# Patient Record
Sex: FEMALE | Race: WHITE | NOT HISPANIC OR LATINO
[De-identification: names, ages, dates, MRNs, and addresses within clinical notes are randomized per-mention and may not be internally consistent; named-entity substitution may affect disease eponyms.]

---

## 2017-01-25 DIAGNOSIS — N64.89 OTHER SPECIFIED DISORDERS OF BREAST: ICD-10-CM

## 2017-07-19 ENCOUNTER — APPOINTMENT (OUTPATIENT)
Dept: OBGYN | Facility: CLINIC | Age: 72
End: 2017-07-19

## 2017-09-21 ENCOUNTER — RX ONLY (RX ONLY)
Age: 72
End: 2017-09-21

## 2018-05-01 ENCOUNTER — RX ONLY (RX ONLY)
Age: 73
End: 2018-05-01

## 2018-06-06 ENCOUNTER — RX ONLY (RX ONLY)
Age: 73
End: 2018-06-06

## 2019-07-24 ENCOUNTER — OFFICE (OUTPATIENT)
Dept: URBAN - METROPOLITAN AREA CLINIC 105 | Facility: CLINIC | Age: 74
Setting detail: OPHTHALMOLOGY
End: 2019-07-24
Payer: MEDICARE

## 2019-07-24 DIAGNOSIS — H25.13: ICD-10-CM

## 2019-07-24 DIAGNOSIS — H11.153: ICD-10-CM

## 2019-07-24 DIAGNOSIS — H40.033: ICD-10-CM

## 2019-07-24 DIAGNOSIS — H40.1232: ICD-10-CM

## 2019-07-24 DIAGNOSIS — H47.393: ICD-10-CM

## 2019-07-24 PROCEDURE — 92020 GONIOSCOPY: CPT | Performed by: OPHTHALMOLOGY

## 2019-07-24 PROCEDURE — 92014 COMPRE OPH EXAM EST PT 1/>: CPT | Performed by: OPHTHALMOLOGY

## 2019-07-24 PROCEDURE — 92250 FUNDUS PHOTOGRAPHY W/I&R: CPT | Performed by: OPHTHALMOLOGY

## 2019-07-24 ASSESSMENT — REFRACTION_MANIFEST
OS_VA2: 20/
OS_VA2: 20/
OD_VA1: 20/
OD_VA3: 20/
OD_VA1: 20/20
OD_SPHERE: +3.25
OS_SPHERE: +2.00
OD_VA2: 20/
OD_VA3: 20/
OS_VA1: 20/
OD_VA2: 20/
OS_VA3: 20/
OS_CYLINDER: -0.75
OU_VA: 20/
OS_VA3: 20/
OU_VA: 20/
OS_AXIS: 090
OD_AXIS: 090
OS_VA1: 20/40
OD_CYLINDER: -0.50

## 2019-07-24 ASSESSMENT — REFRACTION_CURRENTRX
OS_VPRISM_DIRECTION: PROGS
OD_VPRISM_DIRECTION: PROGS
OS_OVR_VA: 20/
OD_ADD: +2.50
OD_OVR_VA: 20/
OD_OVR_VA: 20/
OD_CYLINDER: 0.00
OD_AXIS: 180
OD_OVR_VA: 20/
OD_SPHERE: +3.25
OS_SPHERE: +3.00
OS_AXIS: 115
OS_ADD: +2.50
OS_OVR_VA: 20/
OS_OVR_VA: 20/
OS_CYLINDER: -0.25

## 2019-07-24 ASSESSMENT — AXIALLENGTH_DERIVED
OD_AL: 22.5226
OS_AL: 23.019
OS_AL: 23.1121
OD_AL: 22.4783

## 2019-07-24 ASSESSMENT — SPHEQUIV_DERIVED
OD_SPHEQUIV: 2.875
OS_SPHEQUIV: 1.375
OD_SPHEQUIV: 3
OS_SPHEQUIV: 1.625

## 2019-07-24 ASSESSMENT — CONFRONTATIONAL VISUAL FIELD TEST (CVF)
OS_FINDINGS: FULL
OD_FINDINGS: FULL

## 2019-07-24 ASSESSMENT — REFRACTION_AUTOREFRACTION
OS_CYLINDER: -0.75
OD_AXIS: 042
OS_SPHERE: +1.75
OS_AXIS: 106
OD_CYLINDER: -0.25
OD_SPHERE: +3.00

## 2019-07-24 ASSESSMENT — KERATOMETRY
OS_AXISANGLE_DEGREES: 120
OS_K1POWER_DIOPTERS: 43.25
OD_AXISANGLE_DEGREES: 090
OS_K2POWER_DIOPTERS: 43.50
OD_K1POWER_DIOPTERS: 43.50
OD_K2POWER_DIOPTERS: 43.50

## 2019-07-24 ASSESSMENT — VISUAL ACUITY
OD_BCVA: 20/40
OS_BCVA: 20/30+2

## 2020-02-12 ENCOUNTER — OFFICE (OUTPATIENT)
Dept: URBAN - METROPOLITAN AREA CLINIC 105 | Facility: CLINIC | Age: 75
Setting detail: OPHTHALMOLOGY
End: 2020-02-12
Payer: MEDICARE

## 2020-02-12 DIAGNOSIS — H47.393: ICD-10-CM

## 2020-02-12 DIAGNOSIS — H40.1232: ICD-10-CM

## 2020-02-12 DIAGNOSIS — H11.153: ICD-10-CM

## 2020-02-12 DIAGNOSIS — H52.4: ICD-10-CM

## 2020-02-12 DIAGNOSIS — H25.13: ICD-10-CM

## 2020-02-12 DIAGNOSIS — H40.033: ICD-10-CM

## 2020-02-12 PROCEDURE — 92020 GONIOSCOPY: CPT | Performed by: OPHTHALMOLOGY

## 2020-02-12 PROCEDURE — 92083 EXTENDED VISUAL FIELD XM: CPT | Performed by: OPHTHALMOLOGY

## 2020-02-12 PROCEDURE — 92015 DETERMINE REFRACTIVE STATE: CPT | Performed by: OPHTHALMOLOGY

## 2020-02-12 PROCEDURE — 92012 INTRM OPH EXAM EST PATIENT: CPT | Performed by: OPHTHALMOLOGY

## 2020-02-12 ASSESSMENT — VISUAL ACUITY
OS_BCVA: 20/25-
OD_BCVA: 20/40-2

## 2020-02-12 ASSESSMENT — REFRACTION_CURRENTRX
OD_AXIS: 180
OD_OVR_VA: 20/
OS_AXIS: 115
OD_VPRISM_DIRECTION: PROGS
OS_CYLINDER: -0.25
OS_VPRISM_DIRECTION: PROGS
OS_SPHERE: +3.00
OS_OVR_VA: 20/
OD_ADD: +2.50
OD_CYLINDER: 0.00
OD_SPHERE: +3.25
OS_ADD: +2.50

## 2020-02-12 ASSESSMENT — REFRACTION_AUTOREFRACTION
OS_SPHERE: +1.50
OD_AXIS: 089
OD_CYLINDER: -0.25
OS_AXIS: 112
OS_CYLINDER: -0.25
OD_SPHERE: +3.00

## 2020-02-12 ASSESSMENT — REFRACTION_MANIFEST
OS_SPHERE: +1.50
OS_VA1: 20/40
OS_CYLINDER: -0.50
OD_CYLINDER: -0.25
OS_ADD: +3.00
OD_SPHERE: +3.00
OS_AXIS: 090
OD_AXIS: 090
OD_ADD: +3.00
OD_VA1: 20/25-

## 2020-02-12 ASSESSMENT — KERATOMETRY
OD_K1POWER_DIOPTERS: 43.50
OD_K2POWER_DIOPTERS: 43.50
OS_K2POWER_DIOPTERS: 43.75
OS_AXISANGLE_DEGREES: 112
OD_AXISANGLE_DEGREES: 090
OS_K1POWER_DIOPTERS: 43.50

## 2020-02-12 ASSESSMENT — SPHEQUIV_DERIVED
OD_SPHEQUIV: 2.875
OS_SPHEQUIV: 1.375
OS_SPHEQUIV: 1.25
OD_SPHEQUIV: 2.875

## 2020-02-12 ASSESSMENT — AXIALLENGTH_DERIVED
OS_AL: 23.0708
OS_AL: 23.0243
OD_AL: 22.5226
OD_AL: 22.5226

## 2020-02-12 ASSESSMENT — CONFRONTATIONAL VISUAL FIELD TEST (CVF)
OD_FINDINGS: FULL
OS_FINDINGS: FULL

## 2020-10-01 ENCOUNTER — OFFICE (OUTPATIENT)
Dept: URBAN - METROPOLITAN AREA CLINIC 105 | Facility: CLINIC | Age: 75
Setting detail: OPHTHALMOLOGY
End: 2020-10-01
Payer: MEDICARE

## 2020-10-01 DIAGNOSIS — H16.222: ICD-10-CM

## 2020-10-01 DIAGNOSIS — H40.033: ICD-10-CM

## 2020-10-01 DIAGNOSIS — H47.393: ICD-10-CM

## 2020-10-01 DIAGNOSIS — H16.221: ICD-10-CM

## 2020-10-01 DIAGNOSIS — H16.223: ICD-10-CM

## 2020-10-01 DIAGNOSIS — H40.1232: ICD-10-CM

## 2020-10-01 DIAGNOSIS — H52.03: ICD-10-CM

## 2020-10-01 DIAGNOSIS — H11.153: ICD-10-CM

## 2020-10-01 DIAGNOSIS — H25.13: ICD-10-CM

## 2020-10-01 PROCEDURE — 92133 CPTRZD OPH DX IMG PST SGM ON: CPT | Performed by: OPHTHALMOLOGY

## 2020-10-01 PROCEDURE — 68761 CLOSE TEAR DUCT OPENING: CPT | Performed by: OPHTHALMOLOGY

## 2020-10-01 PROCEDURE — 92014 COMPRE OPH EXAM EST PT 1/>: CPT | Performed by: OPHTHALMOLOGY

## 2020-10-01 PROCEDURE — 92020 GONIOSCOPY: CPT | Performed by: OPHTHALMOLOGY

## 2020-10-01 ASSESSMENT — REFRACTION_MANIFEST
OD_CYLINDER: -0.25
OD_AXIS: 090
OS_ADD: +3.00
OD_ADD: +3.00
OS_VA1: 20/40
OD_SPHERE: +3.00
OS_CYLINDER: -0.50
OS_AXIS: 090
OS_SPHERE: +1.50
OD_VA1: 20/25-

## 2020-10-01 ASSESSMENT — SPHEQUIV_DERIVED
OD_SPHEQUIV: 2.875
OS_SPHEQUIV: 1.375
OS_SPHEQUIV: 1.25
OD_SPHEQUIV: 3.125

## 2020-10-01 ASSESSMENT — KERATOMETRY
OS_K1POWER_DIOPTERS: 43.50
OD_K2POWER_DIOPTERS: 43.50
OD_K1POWER_DIOPTERS: 43.50
OD_AXISANGLE_DEGREES: 090
OS_AXISANGLE_DEGREES: 114
OS_K2POWER_DIOPTERS: 43.75

## 2020-10-01 ASSESSMENT — REFRACTION_CURRENTRX
OD_ADD: +3.00
OS_VPRISM_DIRECTION: PROGS
OS_AXIS: 091
OS_SPHERE: +1.50
OD_SPHERE: +3.25
OS_ADD: +3.00
OS_OVR_VA: 20/
OD_VPRISM_DIRECTION: PROGS
OD_CYLINDER: -0.50
OD_OVR_VA: 20/
OS_CYLINDER: -0.50
OD_AXIS: 092

## 2020-10-01 ASSESSMENT — REFRACTION_AUTOREFRACTION
OS_SPHERE: +1.50
OS_AXIS: 079
OD_CYLINDER: -0.75
OD_SPHERE: +3.50
OD_AXIS: 090
OS_CYLINDER: -0.25

## 2020-10-01 ASSESSMENT — PACHYMETRY
OS_CT_CORRECTION: -4
OS_CT_UM: 600
OD_CT_CORRECTION: -4
OD_CT_UM: 600

## 2020-10-01 ASSESSMENT — CONFRONTATIONAL VISUAL FIELD TEST (CVF)
OD_FINDINGS: FULL
OS_FINDINGS: FULL

## 2020-10-01 ASSESSMENT — TONOMETRY
OS_IOP_MMHG: 14
OD_IOP_MMHG: 16

## 2020-10-01 ASSESSMENT — AXIALLENGTH_DERIVED
OS_AL: 23.0243
OD_AL: 22.4341
OS_AL: 23.0708
OD_AL: 22.5226

## 2020-10-01 ASSESSMENT — SUPERFICIAL PUNCTATE KERATITIS (SPK): OS_SPK: 2+

## 2020-10-01 ASSESSMENT — VISUAL ACUITY
OS_BCVA: 20/25+1
OD_BCVA: 20/40-1

## 2020-11-12 ENCOUNTER — OFFICE (OUTPATIENT)
Dept: URBAN - METROPOLITAN AREA CLINIC 105 | Facility: CLINIC | Age: 75
Setting detail: OPHTHALMOLOGY
End: 2020-11-12
Payer: MEDICARE

## 2020-11-12 DIAGNOSIS — H40.1232: ICD-10-CM

## 2020-11-12 PROCEDURE — 92012 INTRM OPH EXAM EST PATIENT: CPT | Performed by: OPHTHALMOLOGY

## 2020-11-12 ASSESSMENT — AXIALLENGTH_DERIVED
OS_AL: 23.1121
OS_AL: 23.1589
OD_AL: 22.6066
OD_AL: 22.6514

## 2020-11-12 ASSESSMENT — REFRACTION_MANIFEST
OS_VA1: 20/40
OS_AXIS: 090
OS_CYLINDER: -0.50
OD_ADD: +3.00
OD_SPHERE: +3.00
OD_AXIS: 090
OD_CYLINDER: -0.25
OS_ADD: +3.00
OD_VA1: 20/25-
OS_SPHERE: +1.50

## 2020-11-12 ASSESSMENT — PACHYMETRY
OD_CT_UM: 600
OS_CT_UM: 600
OD_CT_CORRECTION: -4
OS_CT_CORRECTION: -4

## 2020-11-12 ASSESSMENT — REFRACTION_CURRENTRX
OD_ADD: +3.25
OS_SPHERE: +1.50
OS_OVR_VA: 20/
OS_CYLINDER: -0.50
OD_AXIS: 103
OS_VPRISM_DIRECTION: PROGS
OD_CYLINDER: -0.25
OD_VPRISM_DIRECTION: PROGS
OD_OVR_VA: 20/
OS_ADD: +3.25
OD_SPHERE: +3.25
OS_AXIS: 084

## 2020-11-12 ASSESSMENT — TONOMETRY
OS_IOP_MMHG: 15
OD_IOP_MMHG: 14

## 2020-11-12 ASSESSMENT — KERATOMETRY
OS_K2POWER_DIOPTERS: 43.50
OD_K1POWER_DIOPTERS: 43.00
OD_K2POWER_DIOPTERS: 43.50
OD_AXISANGLE_DEGREES: 001
OS_AXISANGLE_DEGREES: 121
OS_K1POWER_DIOPTERS: 43.25

## 2020-11-12 ASSESSMENT — SPHEQUIV_DERIVED
OD_SPHEQUIV: 2.875
OS_SPHEQUIV: 1.25
OD_SPHEQUIV: 2.75
OS_SPHEQUIV: 1.375

## 2020-11-12 ASSESSMENT — REFRACTION_AUTOREFRACTION
OD_CYLINDER: -0.50
OD_AXIS: 094
OD_SPHERE: +3.00
OS_SPHERE: +1.75
OS_AXIS: 102
OS_CYLINDER: -0.75

## 2020-11-12 ASSESSMENT — VISUAL ACUITY
OS_BCVA: 20/25
OD_BCVA: 20/40

## 2020-11-12 ASSESSMENT — SUPERFICIAL PUNCTATE KERATITIS (SPK): OS_SPK: 2+

## 2020-11-12 ASSESSMENT — CONFRONTATIONAL VISUAL FIELD TEST (CVF)
OS_FINDINGS: FULL
OD_FINDINGS: FULL

## 2021-06-10 ENCOUNTER — OFFICE (OUTPATIENT)
Dept: URBAN - METROPOLITAN AREA CLINIC 105 | Facility: CLINIC | Age: 76
Setting detail: OPHTHALMOLOGY
End: 2021-06-10
Payer: MEDICARE

## 2021-06-10 DIAGNOSIS — H40.033: ICD-10-CM

## 2021-06-10 DIAGNOSIS — H16.222: ICD-10-CM

## 2021-06-10 DIAGNOSIS — H40.1232: ICD-10-CM

## 2021-06-10 DIAGNOSIS — H25.13: ICD-10-CM

## 2021-06-10 PROCEDURE — 92133 CPTRZD OPH DX IMG PST SGM ON: CPT | Performed by: OPHTHALMOLOGY

## 2021-06-10 PROCEDURE — 92020 GONIOSCOPY: CPT | Performed by: OPHTHALMOLOGY

## 2021-06-10 PROCEDURE — 99213 OFFICE O/P EST LOW 20 MIN: CPT | Performed by: OPHTHALMOLOGY

## 2021-06-10 PROCEDURE — 92083 EXTENDED VISUAL FIELD XM: CPT | Performed by: OPHTHALMOLOGY

## 2021-06-10 ASSESSMENT — SPHEQUIV_DERIVED
OD_SPHEQUIV: 2.875
OS_SPHEQUIV: 1.25
OS_SPHEQUIV: 0.75
OD_SPHEQUIV: 2.625

## 2021-06-10 ASSESSMENT — AXIALLENGTH_DERIVED
OS_AL: 23.1147
OS_AL: 23.3033
OD_AL: 22.6539
OD_AL: 22.5645

## 2021-06-10 ASSESSMENT — REFRACTION_AUTOREFRACTION
OS_CYLINDER: -0.50
OS_AXIS: 094
OD_AXIS: 095
OD_CYLINDER: -0.75
OD_SPHERE: +3.00
OS_SPHERE: +1.00

## 2021-06-10 ASSESSMENT — KERATOMETRY
OD_K2POWER_DIOPTERS: 43.75
OS_K1POWER_DIOPTERS: 43.50
OS_K2POWER_DIOPTERS: 43.50
OD_AXISANGLE_DEGREES: 180
OD_K1POWER_DIOPTERS: 43.00
OS_AXISANGLE_DEGREES: 090

## 2021-06-10 ASSESSMENT — REFRACTION_CURRENTRX
OS_VPRISM_DIRECTION: PROGS
OS_SPHERE: +1.50
OS_AXIS: 086
OS_CYLINDER: -0.50
OS_ADD: +3.00
OD_SPHERE: +3.25
OD_VPRISM_DIRECTION: PROGS
OD_CYLINDER: -0.50
OS_OVR_VA: 20/
OD_ADD: +3.00
OD_OVR_VA: 20/
OD_AXIS: 099

## 2021-06-10 ASSESSMENT — REFRACTION_MANIFEST
OD_SPHERE: +3.00
OS_VA1: 20/40
OD_AXIS: 090
OD_CYLINDER: -0.25
OD_ADD: +3.00
OS_AXIS: 090
OD_VA1: 20/25-
OS_SPHERE: +1.50
OS_ADD: +3.00
OS_CYLINDER: -0.50

## 2021-06-10 ASSESSMENT — PACHYMETRY
OS_CT_UM: 600
OS_CT_CORRECTION: -4
OD_CT_UM: 600
OD_CT_CORRECTION: -4

## 2021-06-10 ASSESSMENT — SUPERFICIAL PUNCTATE KERATITIS (SPK): OS_SPK: 2+

## 2021-06-10 ASSESSMENT — CONFRONTATIONAL VISUAL FIELD TEST (CVF)
OD_FINDINGS: FULL
OS_FINDINGS: FULL

## 2021-06-10 ASSESSMENT — TONOMETRY
OS_IOP_MMHG: 14
OD_IOP_MMHG: 10

## 2021-06-10 ASSESSMENT — VISUAL ACUITY
OS_BCVA: 20/20-2
OD_BCVA: 20/30-1

## 2021-07-01 ENCOUNTER — OFFICE (OUTPATIENT)
Dept: URBAN - METROPOLITAN AREA CLINIC 113 | Facility: CLINIC | Age: 76
Setting detail: OPHTHALMOLOGY
End: 2021-07-01
Payer: MEDICARE

## 2021-07-01 DIAGNOSIS — H40.033: ICD-10-CM

## 2021-07-01 DIAGNOSIS — H40.1232: ICD-10-CM

## 2021-07-01 DIAGNOSIS — H25.13: ICD-10-CM

## 2021-07-01 PROCEDURE — 99214 OFFICE O/P EST MOD 30 MIN: CPT | Performed by: OPHTHALMOLOGY

## 2021-07-01 PROCEDURE — 92250 FUNDUS PHOTOGRAPHY W/I&R: CPT | Performed by: OPHTHALMOLOGY

## 2021-07-01 ASSESSMENT — REFRACTION_MANIFEST
OD_AXIS: 090
OD_ADD: +3.00
OD_CYLINDER: -0.25
OS_AXIS: 090
OD_VA1: 20/25-
OS_CYLINDER: -0.50
OS_VA1: 20/40
OS_SPHERE: +1.50
OD_SPHERE: +3.00
OS_ADD: +3.00

## 2021-07-01 ASSESSMENT — AXIALLENGTH_DERIVED
OS_AL: 23.1147
OS_AL: 23.3033
OD_AL: 22.5645
OD_AL: 22.6539

## 2021-07-01 ASSESSMENT — PACHYMETRY
OD_CT_CORRECTION: -4
OD_CT_UM: 600
OS_CT_CORRECTION: -4
OS_CT_UM: 600

## 2021-07-01 ASSESSMENT — REFRACTION_AUTOREFRACTION
OS_CYLINDER: -0.50
OS_SPHERE: +1.00
OD_CYLINDER: -0.75
OS_AXIS: 094
OD_AXIS: 095
OD_SPHERE: +3.00

## 2021-07-01 ASSESSMENT — SPHEQUIV_DERIVED
OS_SPHEQUIV: 0.75
OD_SPHEQUIV: 2.875
OS_SPHEQUIV: 1.25
OD_SPHEQUIV: 2.625

## 2021-07-01 ASSESSMENT — KERATOMETRY
OD_K1POWER_DIOPTERS: 43.00
OS_K1POWER_DIOPTERS: 43.50
OD_K2POWER_DIOPTERS: 43.75
OD_AXISANGLE_DEGREES: 180
OS_K2POWER_DIOPTERS: 43.50
OS_AXISANGLE_DEGREES: 090

## 2021-07-01 ASSESSMENT — REFRACTION_CURRENTRX
OS_VPRISM_DIRECTION: PROGS
OS_OVR_VA: 20/
OS_SPHERE: +1.50
OS_CYLINDER: -0.50
OD_ADD: +3.00
OS_AXIS: 086
OS_ADD: +3.00
OD_VPRISM_DIRECTION: PROGS
OD_OVR_VA: 20/
OD_SPHERE: +3.25
OD_AXIS: 099
OD_CYLINDER: -0.50

## 2021-07-01 ASSESSMENT — SUPERFICIAL PUNCTATE KERATITIS (SPK): OS_SPK: 2+

## 2021-07-01 ASSESSMENT — TONOMETRY
OS_IOP_MMHG: 19
OD_IOP_MMHG: 18

## 2021-07-01 ASSESSMENT — CONFRONTATIONAL VISUAL FIELD TEST (CVF)
OD_FINDINGS: FULL
OS_FINDINGS: FULL

## 2021-07-01 ASSESSMENT — VISUAL ACUITY
OD_BCVA: 20/40
OS_BCVA: 20/25

## 2021-08-05 ENCOUNTER — OFFICE (OUTPATIENT)
Dept: URBAN - METROPOLITAN AREA CLINIC 113 | Facility: CLINIC | Age: 76
Setting detail: OPHTHALMOLOGY
End: 2021-08-05
Payer: MEDICARE

## 2021-08-05 ENCOUNTER — RX ONLY (RX ONLY)
Age: 76
End: 2021-08-05

## 2021-08-05 DIAGNOSIS — H40.033: ICD-10-CM

## 2021-08-05 DIAGNOSIS — H40.1232: ICD-10-CM

## 2021-08-05 DIAGNOSIS — H25.13: ICD-10-CM

## 2021-08-05 PROCEDURE — 92012 INTRM OPH EXAM EST PATIENT: CPT | Performed by: OPHTHALMOLOGY

## 2021-08-05 ASSESSMENT — SUPERFICIAL PUNCTATE KERATITIS (SPK): OS_SPK: 2+

## 2021-08-05 ASSESSMENT — REFRACTION_CURRENTRX
OS_ADD: +3.00
OD_AXIS: 099
OS_CYLINDER: -0.50
OD_ADD: +3.00
OS_VPRISM_DIRECTION: PROGS
OD_CYLINDER: -0.50
OD_OVR_VA: 20/
OS_SPHERE: +1.50
OD_SPHERE: +3.25
OD_VPRISM_DIRECTION: PROGS
OS_OVR_VA: 20/
OS_AXIS: 086

## 2021-08-05 ASSESSMENT — SPHEQUIV_DERIVED
OD_SPHEQUIV: 2.625
OS_SPHEQUIV: 1.25
OD_SPHEQUIV: 2.875
OS_SPHEQUIV: 0.75

## 2021-08-05 ASSESSMENT — PACHYMETRY
OS_CT_UM: 600
OS_CT_CORRECTION: -4
OD_CT_CORRECTION: -4
OD_CT_UM: 600

## 2021-08-05 ASSESSMENT — REFRACTION_MANIFEST
OD_AXIS: 090
OS_CYLINDER: -0.50
OS_ADD: +3.00
OD_ADD: +3.00
OD_SPHERE: +3.00
OS_SPHERE: +1.50
OD_VA1: 20/25-
OS_VA1: 20/40
OD_CYLINDER: -0.25
OS_AXIS: 090

## 2021-08-05 ASSESSMENT — VISUAL ACUITY
OD_BCVA: 20/40
OS_BCVA: 20/25

## 2021-08-05 ASSESSMENT — REFRACTION_AUTOREFRACTION
OD_CYLINDER: -0.75
OD_SPHERE: +3.00
OS_CYLINDER: -0.50
OS_AXIS: 094
OD_AXIS: 095
OS_SPHERE: +1.00

## 2021-08-05 ASSESSMENT — TONOMETRY
OD_IOP_MMHG: 16
OS_IOP_MMHG: 16

## 2021-08-05 ASSESSMENT — KERATOMETRY
OD_AXISANGLE_DEGREES: 180
OS_K1POWER_DIOPTERS: 43.50
OD_K1POWER_DIOPTERS: 43.00
OD_K2POWER_DIOPTERS: 43.75
OS_AXISANGLE_DEGREES: 090
OS_K2POWER_DIOPTERS: 43.50

## 2021-08-05 ASSESSMENT — CONFRONTATIONAL VISUAL FIELD TEST (CVF)
OD_FINDINGS: FULL
OS_FINDINGS: FULL

## 2021-08-05 ASSESSMENT — AXIALLENGTH_DERIVED
OS_AL: 23.1147
OD_AL: 22.6539
OD_AL: 22.5645
OS_AL: 23.3033

## 2021-09-02 ENCOUNTER — OFFICE (OUTPATIENT)
Dept: URBAN - METROPOLITAN AREA CLINIC 113 | Facility: CLINIC | Age: 76
Setting detail: OPHTHALMOLOGY
End: 2021-09-02
Payer: MEDICARE

## 2021-09-02 DIAGNOSIS — H25.12: ICD-10-CM

## 2021-09-02 DIAGNOSIS — H40.033: ICD-10-CM

## 2021-09-02 DIAGNOSIS — H40.1132: ICD-10-CM

## 2021-09-02 DIAGNOSIS — H25.13: ICD-10-CM

## 2021-09-02 PROCEDURE — 99214 OFFICE O/P EST MOD 30 MIN: CPT | Performed by: OPHTHALMOLOGY

## 2021-09-02 PROCEDURE — 92136 OPHTHALMIC BIOMETRY: CPT | Performed by: OPHTHALMOLOGY

## 2021-09-02 ASSESSMENT — KERATOMETRY
OD_CYLAXISANGLE_DEGREES: 092
OD_K2POWER_DIOPTERS: 45.25
OS_CYLPOWER_DEGREES: 0.5
OS_CYLAXISANGLE_DEGREES: 141
OS_K2POWER_DIOPTERS: 44.50
OD_CYLPOWER_DEGREES: 1.75
OS_AXISANGLE2_DEGREES: 141
OS_K1K2_AVERAGE: 44.25
OS_K1POWER_DIOPTERS: 44.00
OD_AXISANGLE_DEGREES: 2
OD_AXISANGLE2_DEGREES: 092
OD_K1POWER_DIOPTERS: 43.50
OS_AXISANGLE_DEGREES: 51
OD_K1K2_AVERAGE: 44.375

## 2021-09-02 ASSESSMENT — CONFRONTATIONAL VISUAL FIELD TEST (CVF)
OS_FINDINGS: FULL
OD_FINDINGS: FULL

## 2021-09-02 ASSESSMENT — TONOMETRY
OS_IOP_MMHG: 14
OD_IOP_MMHG: 14

## 2021-09-02 ASSESSMENT — PACHYMETRY
OS_CT_UM: 600
OD_CT_CORRECTION: -4
OD_CT_UM: 600
OS_CT_CORRECTION: -4

## 2021-09-02 ASSESSMENT — SUPERFICIAL PUNCTATE KERATITIS (SPK): OS_SPK: 2+

## 2021-09-17 ASSESSMENT — KERATOMETRY
OS_AXISANGLE_DEGREES: 141
OD_AXISANGLE_DEGREES: 092
OS_K1POWER_DIOPTERS: 44.00
OS_K2POWER_DIOPTERS: 44.50
OD_K1POWER_DIOPTERS: 43.50
OD_K2POWER_DIOPTERS: 45.25

## 2021-09-17 ASSESSMENT — REFRACTION_CURRENTRX
OS_AXIS: 086
OD_ADD: +3.00
OD_SPHERE: +3.25
OS_VPRISM_DIRECTION: PROGS
OD_OVR_VA: 20/
OS_SPHERE: +1.50
OD_CYLINDER: -0.50
OD_VPRISM_DIRECTION: PROGS
OS_CYLINDER: -0.50
OS_OVR_VA: 20/
OD_AXIS: 099
OS_ADD: +3.00

## 2021-09-17 ASSESSMENT — SPHEQUIV_DERIVED
OS_SPHEQUIV: 0.75
OD_SPHEQUIV: 2.875
OD_SPHEQUIV: 1.875
OS_SPHEQUIV: 1.25

## 2021-09-17 ASSESSMENT — VISUAL ACUITY
OD_BCVA: 20/30
OS_BCVA: 20/20-

## 2021-09-17 ASSESSMENT — AXIALLENGTH_DERIVED
OD_AL: 22.2334
OS_AL: 23.0376
OS_AL: 22.8533
OD_AL: 22.5849

## 2021-09-17 ASSESSMENT — REFRACTION_AUTOREFRACTION
OD_AXIS: 001
OS_AXIS: 141
OS_SPHERE: +1.00
OD_SPHERE: +2.50
OS_CYLINDER: -0.50
OD_CYLINDER: -1.25

## 2021-09-17 ASSESSMENT — REFRACTION_MANIFEST
OD_ADD: +3.00
OD_CYLINDER: -0.25
OS_AXIS: 090
OS_CYLINDER: -0.50
OS_SPHERE: +1.50
OS_ADD: +3.00
OS_VA1: 20/40
OD_VA1: 20/25-
OD_SPHERE: +3.00
OD_AXIS: 090

## 2021-09-30 ENCOUNTER — OFFICE (OUTPATIENT)
Dept: URBAN - METROPOLITAN AREA CLINIC 113 | Facility: CLINIC | Age: 76
Setting detail: OPHTHALMOLOGY
End: 2021-09-30
Payer: MEDICARE

## 2021-09-30 DIAGNOSIS — H40.033: ICD-10-CM

## 2021-09-30 DIAGNOSIS — H25.13: ICD-10-CM

## 2021-09-30 DIAGNOSIS — H40.1112: ICD-10-CM

## 2021-09-30 DIAGNOSIS — H40.1122: ICD-10-CM

## 2021-09-30 PROCEDURE — 92012 INTRM OPH EXAM EST PATIENT: CPT | Performed by: OPHTHALMOLOGY

## 2021-09-30 ASSESSMENT — REFRACTION_AUTOREFRACTION
OD_CYLINDER: -1.25
OD_AXIS: 001
OS_CYLINDER: -0.50
OD_SPHERE: +2.50
OS_SPHERE: +1.00
OS_AXIS: 141

## 2021-09-30 ASSESSMENT — REFRACTION_CURRENTRX
OS_OVR_VA: 20/
OD_VPRISM_DIRECTION: PROGS
OD_SPHERE: +3.25
OD_ADD: +3.00
OD_AXIS: 099
OD_CYLINDER: -0.50
OS_ADD: +3.00
OS_AXIS: 086
OS_CYLINDER: -0.50
OD_OVR_VA: 20/
OS_VPRISM_DIRECTION: PROGS
OS_SPHERE: +1.50

## 2021-09-30 ASSESSMENT — CONFRONTATIONAL VISUAL FIELD TEST (CVF)
OD_FINDINGS: FULL
OS_FINDINGS: FULL

## 2021-09-30 ASSESSMENT — REFRACTION_MANIFEST
OS_VA1: 20/40
OS_SPHERE: +1.50
OD_VA1: 20/25-
OD_AXIS: 090
OD_CYLINDER: -0.25
OD_ADD: +3.00
OS_AXIS: 090
OD_SPHERE: +3.00
OS_ADD: +3.00
OS_CYLINDER: -0.50

## 2021-09-30 ASSESSMENT — PACHYMETRY
OS_CT_UM: 600
OD_CT_CORRECTION: -4
OS_CT_CORRECTION: -4
OD_CT_UM: 600

## 2021-09-30 ASSESSMENT — SPHEQUIV_DERIVED
OD_SPHEQUIV: 1.875
OD_SPHEQUIV: 2.875
OS_SPHEQUIV: 1.25
OS_SPHEQUIV: 0.75

## 2021-09-30 ASSESSMENT — AXIALLENGTH_DERIVED
OS_AL: 22.8533
OD_AL: 22.2334
OD_AL: 22.5849
OS_AL: 23.0376

## 2021-09-30 ASSESSMENT — VISUAL ACUITY
OD_BCVA: 20/40
OS_BCVA: 20/25

## 2021-09-30 ASSESSMENT — TONOMETRY
OS_IOP_MMHG: 14
OD_IOP_MMHG: 14

## 2021-09-30 ASSESSMENT — KERATOMETRY
OD_AXISANGLE_DEGREES: 092
OS_AXISANGLE_DEGREES: 141
OD_K1POWER_DIOPTERS: 43.50
OS_K1POWER_DIOPTERS: 44.00
OD_K2POWER_DIOPTERS: 45.25
OS_K2POWER_DIOPTERS: 44.50

## 2021-09-30 ASSESSMENT — SUPERFICIAL PUNCTATE KERATITIS (SPK): OS_SPK: 2+

## 2021-10-06 ENCOUNTER — ASC (OUTPATIENT)
Dept: URBAN - METROPOLITAN AREA SURGERY 8 | Facility: SURGERY | Age: 76
Setting detail: OPHTHALMOLOGY
End: 2021-10-06
Payer: MEDICARE

## 2021-10-06 DIAGNOSIS — H40.1122: ICD-10-CM

## 2021-10-06 DIAGNOSIS — H25.12: ICD-10-CM

## 2021-10-06 DIAGNOSIS — H52.212: ICD-10-CM

## 2021-10-06 PROCEDURE — 66984 XCAPSL CTRC RMVL W/O ECP: CPT | Performed by: OPHTHALMOLOGY

## 2021-10-06 PROCEDURE — 0191T INSERTION OF ANTERIOR SEGMENT AQUEOUS DRAINAGE DEVICE, WITHOUT EXTRAOCULAR RESERVOIR; INTERNAL APPROACH: CPT | Performed by: OPHTHALMOLOGY

## 2021-10-06 PROCEDURE — FEMTO CATARACT LASER: Performed by: OPHTHALMOLOGY

## 2021-10-06 PROCEDURE — 0356T INSERTION OF DRUG-ELUTING IMPLANT (INCLUDING PUNCTAL DILATION AND IMPLANT REMOVAL WHEN PERFORMED) INTO LACRIMAL CANALICULUS, EACH: CPT | Performed by: OPHTHALMOLOGY

## 2021-10-06 PROCEDURE — A9270 NON-COVERED ITEM OR SERVICE: HCPCS | Performed by: OPHTHALMOLOGY

## 2021-10-07 ENCOUNTER — RX ONLY (RX ONLY)
Age: 76
End: 2021-10-07

## 2021-10-07 ENCOUNTER — OFFICE (OUTPATIENT)
Dept: URBAN - METROPOLITAN AREA CLINIC 113 | Facility: CLINIC | Age: 76
Setting detail: OPHTHALMOLOGY
End: 2021-10-07
Payer: MEDICARE

## 2021-10-07 DIAGNOSIS — H40.1112: ICD-10-CM

## 2021-10-07 DIAGNOSIS — H25.11: ICD-10-CM

## 2021-10-07 DIAGNOSIS — Z96.1: ICD-10-CM

## 2021-10-07 DIAGNOSIS — H40.033: ICD-10-CM

## 2021-10-07 DIAGNOSIS — H40.1122: ICD-10-CM

## 2021-10-07 PROCEDURE — 99024 POSTOP FOLLOW-UP VISIT: CPT | Performed by: OPHTHALMOLOGY

## 2021-10-07 ASSESSMENT — KERATOMETRY
OD_AXISANGLE_DEGREES: 090
OS_K2POWER_DIOPTERS: 44.75
OD_K1POWER_DIOPTERS: 43.50
OD_K2POWER_DIOPTERS: 43.50
METHOD_AUTO_MANUAL: AUTO
OS_K1POWER_DIOPTERS: 43.00
OS_AXISANGLE_DEGREES: 170

## 2021-10-07 ASSESSMENT — AXIALLENGTH_DERIVED
OD_AL: 22.6117
OS_AL: 22.9833
OD_AL: 22.5226
OS_AL: 24.0472

## 2021-10-07 ASSESSMENT — REFRACTION_MANIFEST
OS_SPHERE: +1.50
OS_CYLINDER: -0.50
OS_VA1: 20/40
OD_VA1: 20/25-
OD_AXIS: 090
OD_ADD: +3.00
OD_SPHERE: +3.00
OS_ADD: +3.00
OS_AXIS: 090
OD_CYLINDER: -0.25

## 2021-10-07 ASSESSMENT — SPHEQUIV_DERIVED
OS_SPHEQUIV: 1.25
OD_SPHEQUIV: 2.875
OS_SPHEQUIV: -1.5
OD_SPHEQUIV: 2.625

## 2021-10-07 ASSESSMENT — PACHYMETRY
OS_CT_CORRECTION: -4
OD_CT_UM: 600
OD_CT_CORRECTION: -4
OS_CT_UM: 600

## 2021-10-07 ASSESSMENT — TONOMETRY
OD_IOP_MMHG: 12
OS_IOP_MMHG: 13
OS_IOP_MMHG: 18

## 2021-10-07 ASSESSMENT — REFRACTION_AUTOREFRACTION
OD_AXIS: 074
OS_AXIS: 084
OS_SPHERE: -0.25
OD_SPHERE: +2.75
OS_CYLINDER: -2.50
OD_CYLINDER: -0.25

## 2021-10-07 ASSESSMENT — REFRACTION_CURRENTRX
OS_ADD: +3.00
OS_VPRISM_DIRECTION: PROGS
OD_VPRISM_DIRECTION: PROGS
OD_SPHERE: +3.25
OS_CYLINDER: -0.50
OS_OVR_VA: 20/
OD_AXIS: 099
OD_OVR_VA: 20/
OD_CYLINDER: -0.50
OD_ADD: +3.00
OS_SPHERE: +1.50
OS_AXIS: 086

## 2021-10-07 ASSESSMENT — VISUAL ACUITY
OS_BCVA: 20/25
OD_BCVA: 20/200

## 2021-10-07 ASSESSMENT — CONFRONTATIONAL VISUAL FIELD TEST (CVF)
OS_FINDINGS: FULL
OD_FINDINGS: FULL

## 2021-10-07 ASSESSMENT — SUPERFICIAL PUNCTATE KERATITIS (SPK): OS_SPK: 2+

## 2021-10-07 ASSESSMENT — CORNEAL EDEMA CLINICAL DESCRIPTION: OS_CORNEALEDEMA: 2+

## 2021-10-14 ENCOUNTER — OFFICE (OUTPATIENT)
Dept: URBAN - METROPOLITAN AREA CLINIC 113 | Facility: CLINIC | Age: 76
Setting detail: OPHTHALMOLOGY
End: 2021-10-14
Payer: MEDICARE

## 2021-10-14 DIAGNOSIS — H40.033: ICD-10-CM

## 2021-10-14 DIAGNOSIS — H25.11: ICD-10-CM

## 2021-10-14 DIAGNOSIS — H40.1112: ICD-10-CM

## 2021-10-14 DIAGNOSIS — H40.1122: ICD-10-CM

## 2021-10-14 DIAGNOSIS — Z96.1: ICD-10-CM

## 2021-10-14 PROCEDURE — 99024 POSTOP FOLLOW-UP VISIT: CPT | Performed by: OPHTHALMOLOGY

## 2021-10-14 PROCEDURE — 92136 OPHTHALMIC BIOMETRY: CPT | Performed by: OPHTHALMOLOGY

## 2021-10-14 ASSESSMENT — SPHEQUIV_DERIVED
OD_SPHEQUIV: 2.625
OS_SPHEQUIV: -1.5
OD_SPHEQUIV: 2.875
OS_SPHEQUIV: 1.25
OD_SPHEQUIV: 2.625
OS_SPHEQUIV: -1.5

## 2021-10-14 ASSESSMENT — REFRACTION_CURRENTRX
OS_ADD: +3.00
OS_OVR_VA: 20/
OS_VPRISM_DIRECTION: PROGS
OS_SPHERE: +1.50
OD_VPRISM_DIRECTION: PROGS
OD_CYLINDER: -0.50
OS_AXIS: 086
OS_CYLINDER: -0.50
OD_AXIS: 099
OD_OVR_VA: 20/
OD_SPHERE: +3.25
OD_ADD: +3.00

## 2021-10-14 ASSESSMENT — REFRACTION_AUTOREFRACTION
OD_CYLINDER: -0.25
OS_AXIS: 084
OD_AXIS: 074
OS_CYLINDER: -2.50
OS_SPHERE: -0.25
OD_SPHERE: +2.75

## 2021-10-14 ASSESSMENT — PACHYMETRY
OD_CT_UM: 600
OS_CT_UM: 600
OD_CT_CORRECTION: -4
OS_CT_CORRECTION: -4

## 2021-10-14 ASSESSMENT — REFRACTION_MANIFEST
OD_ADD: +3.00
OS_CYLINDER: -0.50
OS_VA1: 20/25
OS_VA1: 20/40
OD_CYLINDER: -0.25
OS_ADD: +3.00
OS_AXIS: 090
OD_VA1: 20/25-
OS_AXIS: 084
OD_VA1: 20/25
OD_SPHERE: +3.00
OS_SPHERE: -0.25
OD_CYLINDER: -0.25
OD_AXIS: 074
OS_CYLINDER: -2.50
OD_AXIS: 090
OS_SPHERE: +1.50
OD_SPHERE: +2.75

## 2021-10-14 ASSESSMENT — KERATOMETRY
OD_AXISANGLE_DEGREES: 090
OD_K1POWER_DIOPTERS: 43.50
OD_K2POWER_DIOPTERS: 43.50
OS_AXISANGLE_DEGREES: 170
METHOD_AUTO_MANUAL: AUTO
OS_K2POWER_DIOPTERS: 44.75
OS_K1POWER_DIOPTERS: 43.00

## 2021-10-14 ASSESSMENT — CORNEAL EDEMA CLINICAL DESCRIPTION: OS_CORNEALEDEMA: 2+

## 2021-10-14 ASSESSMENT — VISUAL ACUITY
OD_BCVA: 20/25+1
OS_BCVA: 20/25

## 2021-10-14 ASSESSMENT — TONOMETRY
OD_IOP_MMHG: 13
OS_IOP_MMHG: 10

## 2021-10-14 ASSESSMENT — AXIALLENGTH_DERIVED
OS_AL: 24.0472
OD_AL: 22.6117
OD_AL: 22.6117
OD_AL: 22.5226
OS_AL: 24.0472
OS_AL: 22.9833

## 2021-10-14 ASSESSMENT — CONFRONTATIONAL VISUAL FIELD TEST (CVF)
OS_FINDINGS: FULL
OD_FINDINGS: FULL

## 2021-10-14 ASSESSMENT — SUPERFICIAL PUNCTATE KERATITIS (SPK): OS_SPK: 2+

## 2021-10-20 ENCOUNTER — ASC (OUTPATIENT)
Dept: URBAN - METROPOLITAN AREA SURGERY 8 | Facility: SURGERY | Age: 76
Setting detail: OPHTHALMOLOGY
End: 2021-10-20
Payer: MEDICARE

## 2021-10-20 DIAGNOSIS — H25.11: ICD-10-CM

## 2021-10-20 DIAGNOSIS — H40.1112: ICD-10-CM

## 2021-10-20 DIAGNOSIS — H52.211: ICD-10-CM

## 2021-10-20 PROCEDURE — 66984 XCAPSL CTRC RMVL W/O ECP: CPT | Performed by: OPHTHALMOLOGY

## 2021-10-20 PROCEDURE — A9270 NON-COVERED ITEM OR SERVICE: HCPCS | Performed by: OPHTHALMOLOGY

## 2021-10-20 PROCEDURE — 0191T INSERTION OF ANTERIOR SEGMENT AQUEOUS DRAINAGE DEVICE, WITHOUT EXTRAOCULAR RESERVOIR; INTERNAL APPROACH: CPT | Performed by: OPHTHALMOLOGY

## 2021-10-20 PROCEDURE — FEMTO CATARACT LASER: Performed by: OPHTHALMOLOGY

## 2021-10-20 PROCEDURE — 0356T INSERTION OF DRUG-ELUTING IMPLANT (INCLUDING PUNCTAL DILATION AND IMPLANT REMOVAL WHEN PERFORMED) INTO LACRIMAL CANALICULUS, EACH: CPT | Performed by: OPHTHALMOLOGY

## 2021-10-21 ENCOUNTER — OFFICE (OUTPATIENT)
Dept: URBAN - METROPOLITAN AREA CLINIC 113 | Facility: CLINIC | Age: 76
Setting detail: OPHTHALMOLOGY
End: 2021-10-21
Payer: MEDICARE

## 2021-10-21 DIAGNOSIS — H40.1112: ICD-10-CM

## 2021-10-21 DIAGNOSIS — Z96.1: ICD-10-CM

## 2021-10-21 DIAGNOSIS — H40.1122: ICD-10-CM

## 2021-10-21 PROCEDURE — 99024 POSTOP FOLLOW-UP VISIT: CPT | Performed by: OPHTHALMOLOGY

## 2021-10-21 ASSESSMENT — REFRACTION_MANIFEST
OD_AXIS: 090
OD_VA1: 20/25
OD_CYLINDER: -0.25
OD_ADD: +3.00
OS_CYLINDER: -0.50
OS_AXIS: 090
OS_CYLINDER: -2.50
OS_SPHERE: -0.25
OD_SPHERE: +2.75
OS_ADD: +3.00
OD_CYLINDER: -0.25
OD_AXIS: 074
OS_VA1: 20/40
OD_VA1: 20/25-
OS_AXIS: 084
OD_SPHERE: +3.00
OS_SPHERE: +1.50
OS_VA1: 20/25

## 2021-10-21 ASSESSMENT — AXIALLENGTH_DERIVED
OD_AL: 16.54
OD_AL: 22.2384
OS_AL: 22.9833
OS_AL: 24.0472
OD_AL: 22.1521
OS_AL: 23.6003

## 2021-10-21 ASSESSMENT — REFRACTION_CURRENTRX
OS_ADD: +3.00
OS_CYLINDER: -0.50
OD_AXIS: 099
OS_SPHERE: +1.50
OS_OVR_VA: 20/
OS_VPRISM_DIRECTION: PROGS
OD_OVR_VA: 20/
OD_CYLINDER: -0.50
OD_ADD: +3.00
OD_VPRISM_DIRECTION: PROGS
OD_SPHERE: +3.25
OS_AXIS: 086

## 2021-10-21 ASSESSMENT — REFRACTION_AUTOREFRACTION
OD_SPHERE: +24.75
OS_CYLINDER: -0.75
OD_AXIS: 0
OS_AXIS: 080
OD_CYLINDER: 0.00
OS_SPHERE: 0.00

## 2021-10-21 ASSESSMENT — PACHYMETRY
OS_CT_UM: 600
OD_CT_CORRECTION: -4
OS_CT_CORRECTION: -4
OD_CT_UM: 600

## 2021-10-21 ASSESSMENT — TONOMETRY
OS_IOP_MMHG: 13
OD_IOP_MMHG: 18

## 2021-10-21 ASSESSMENT — CONFRONTATIONAL VISUAL FIELD TEST (CVF)
OS_FINDINGS: FULL
OD_FINDINGS: FULL

## 2021-10-21 ASSESSMENT — KERATOMETRY
OD_AXISANGLE_DEGREES: 110
OS_K2POWER_DIOPTERS: 44.00
METHOD_AUTO_MANUAL: AUTO
OS_AXISANGLE_DEGREES: 161
OD_K1POWER_DIOPTERS: 44.00
OD_K2POWER_DIOPTERS: 45.25
OS_K1POWER_DIOPTERS: 43.75

## 2021-10-21 ASSESSMENT — SPHEQUIV_DERIVED
OD_SPHEQUIV: 2.875
OS_SPHEQUIV: -0.375
OS_SPHEQUIV: -1.5
OS_SPHEQUIV: 1.25
OD_SPHEQUIV: 24.75
OD_SPHEQUIV: 2.625

## 2021-10-21 ASSESSMENT — SUPERFICIAL PUNCTATE KERATITIS (SPK): OS_SPK: 2+

## 2021-10-21 ASSESSMENT — CORNEAL EDEMA CLINICAL DESCRIPTION
OS_CORNEALEDEMA: 2+
OD_CORNEALEDEMA: 2+

## 2021-10-21 ASSESSMENT — VISUAL ACUITY
OS_BCVA: 20/80
OD_BCVA: 20/40

## 2021-10-27 ENCOUNTER — OFFICE (OUTPATIENT)
Dept: URBAN - METROPOLITAN AREA CLINIC 113 | Facility: CLINIC | Age: 76
Setting detail: OPHTHALMOLOGY
End: 2021-10-27
Payer: MEDICARE

## 2021-10-27 DIAGNOSIS — Z96.1: ICD-10-CM

## 2021-10-27 DIAGNOSIS — H16.8: ICD-10-CM

## 2021-10-27 PROBLEM — H25.11 CATARACT SENILE NUCLEAR SCLEROSIS;  , RIGHT EYE: Status: RESOLVED | Noted: 2021-10-07 | Resolved: 2021-10-27

## 2021-10-27 PROCEDURE — 99024 POSTOP FOLLOW-UP VISIT: CPT | Performed by: OPTOMETRIST

## 2021-10-27 ASSESSMENT — REFRACTION_MANIFEST
OS_SPHERE: +1.50
OS_CYLINDER: -2.50
OS_CYLINDER: -0.50
OD_SPHERE: +3.00
OD_VA1: 20/25-
OS_VA1: 20/25
OS_SPHERE: -0.25
OD_VA1: 20/25
OD_AXIS: 074
OD_CYLINDER: -0.25
OS_AXIS: 084
OD_AXIS: 090
OD_CYLINDER: -0.25
OS_VA1: 20/40
OS_ADD: +3.00
OD_ADD: +3.00
OS_AXIS: 090
OD_SPHERE: +2.75

## 2021-10-27 ASSESSMENT — TONOMETRY
OD_IOP_MMHG: 10
OS_IOP_MMHG: 15

## 2021-10-27 ASSESSMENT — SPHEQUIV_DERIVED
OD_SPHEQUIV: 2.625
OS_SPHEQUIV: 1.25
OS_SPHEQUIV: 0.125
OD_SPHEQUIV: 2.875
OD_SPHEQUIV: 0.625
OS_SPHEQUIV: -1.5

## 2021-10-27 ASSESSMENT — KERATOMETRY
OS_AXISANGLE_DEGREES: 009
OS_K1POWER_DIOPTERS: 43.00
OD_K2POWER_DIOPTERS: 43.75
OS_K2POWER_DIOPTERS: 44.25
OD_AXISANGLE_DEGREES: 176
OD_K1POWER_DIOPTERS: 43.25
METHOD_AUTO_MANUAL: AUTO

## 2021-10-27 ASSESSMENT — REFRACTION_AUTOREFRACTION
OD_CYLINDER: -1.25
OD_AXIS: 094
OS_SPHERE: +1.25
OS_AXIS: 094
OS_CYLINDER: -2.25
OD_SPHERE: +1.25

## 2021-10-27 ASSESSMENT — SUPERFICIAL PUNCTATE KERATITIS (SPK)
OD_SPK: ABSENT
OS_SPK: 2+ 3+

## 2021-10-27 ASSESSMENT — DRY EYES - PHYSICIAN NOTES: OS_GENERALCOMMENTS: INFERIOR

## 2021-10-27 ASSESSMENT — PACHYMETRY
OD_CT_UM: 600
OD_CT_CORRECTION: -4
OS_CT_UM: 600
OS_CT_CORRECTION: -4

## 2021-10-27 ASSESSMENT — CORNEAL EDEMA - MICROCYSTIC EPITHELIAL EDEMA (MCE): OD_MCE: 1+

## 2021-10-27 ASSESSMENT — AXIALLENGTH_DERIVED
OS_AL: 23.0708
OS_AL: 24.143
OS_AL: 23.4977
OD_AL: 22.6117
OD_AL: 23.3509
OD_AL: 22.5226

## 2021-10-27 ASSESSMENT — VISUAL ACUITY
OD_BCVA: 20/40
OS_BCVA: 20/20

## 2021-10-27 ASSESSMENT — REFRACTION_CURRENTRX
OD_ADD: +3.00
OS_VPRISM_DIRECTION: PROGS
OD_SPHERE: +3.25
OD_VPRISM_DIRECTION: PROGS
OD_AXIS: 099
OD_CYLINDER: -0.50
OS_OVR_VA: 20/
OS_AXIS: 086
OS_ADD: +3.00
OD_OVR_VA: 20/
OS_CYLINDER: -0.50
OS_SPHERE: +1.50

## 2021-10-27 ASSESSMENT — CONFRONTATIONAL VISUAL FIELD TEST (CVF)
OD_FINDINGS: FULL
OS_FINDINGS: FULL

## 2021-10-27 ASSESSMENT — CORNEAL EDEMA - FOLDS/STRIAE: OD_FOLDSSTRIAE: 1+

## 2021-10-27 ASSESSMENT — CORNEAL TRAUMA - EPITHELIUM: OS_EPITHELIALDYSTROPHY: IRREGULAR EPITHELIUM

## 2021-11-11 ENCOUNTER — OFFICE (OUTPATIENT)
Dept: URBAN - METROPOLITAN AREA CLINIC 113 | Facility: CLINIC | Age: 76
Setting detail: OPHTHALMOLOGY
End: 2021-11-11
Payer: MEDICARE

## 2021-11-11 DIAGNOSIS — Z96.1: ICD-10-CM

## 2021-11-11 DIAGNOSIS — H40.1122: ICD-10-CM

## 2021-11-11 DIAGNOSIS — H40.1112: ICD-10-CM

## 2021-11-11 DIAGNOSIS — H40.033: ICD-10-CM

## 2021-11-11 DIAGNOSIS — H16.222: ICD-10-CM

## 2021-11-11 PROCEDURE — 99024 POSTOP FOLLOW-UP VISIT: CPT | Performed by: OPHTHALMOLOGY

## 2021-11-11 ASSESSMENT — REFRACTION_MANIFEST
OD_VA1: 20/25-
OS_ADD: +3.00
OS_SPHERE: PLANO
OD_SPHERE: +3.00
OD_CYLINDER: -0.25
OS_VA1: 20/25
OS_AXIS: 084
OS_AXIS: 090
OD_VA1: 20/25
OS_SPHERE: -0.25
OD_AXIS: 074
OD_ADD: +2.25
OD_VA2: 20/20
OD_SPHERE: PLANO
OS_VA1: 20/40
OS_SPHERE: +1.50
OS_CYLINDER: -0.50
OD_CYLINDER: -0.25
OS_CYLINDER: -2.50
OS_VA2: 20/20
OD_AXIS: 090
OD_SPHERE: +2.75
OD_ADD: +3.00
OS_ADD: +2.25

## 2021-11-11 ASSESSMENT — REFRACTION_CURRENTRX
OD_AXIS: 099
OS_OVR_VA: 20/
OS_AXIS: 086
OS_SPHERE: +1.50
OS_ADD: +3.00
OS_VPRISM_DIRECTION: PROGS
OD_SPHERE: +3.25
OS_CYLINDER: -0.50
OD_ADD: +3.00
OD_CYLINDER: -0.50
OD_VPRISM_DIRECTION: PROGS
OD_OVR_VA: 20/

## 2021-11-11 ASSESSMENT — KERATOMETRY
OD_AXISANGLE_DEGREES: 176
OS_K2POWER_DIOPTERS: 44.25
OS_K1POWER_DIOPTERS: 43.00
OD_K1POWER_DIOPTERS: 43.25
OD_K2POWER_DIOPTERS: 43.75
OS_AXISANGLE_DEGREES: 009
METHOD_AUTO_MANUAL: AUTO

## 2021-11-11 ASSESSMENT — AXIALLENGTH_DERIVED
OD_AL: 23.4467
OS_AL: 23.6925
OD_AL: 22.6117
OD_AL: 22.5226
OS_AL: 24.143
OS_AL: 23.0708

## 2021-11-11 ASSESSMENT — CORNEAL TRAUMA - EPITHELIUM: OS_EPITHELIALDYSTROPHY: IRREGULAR EPITHELIUM

## 2021-11-11 ASSESSMENT — REFRACTION_AUTOREFRACTION
OS_CYLINDER: -1.25
OD_CYLINDER: -1.75
OD_SPHERE: +1.25
OD_AXIS: 100
OS_AXIS: 086
OS_SPHERE: +0.25

## 2021-11-11 ASSESSMENT — CONFRONTATIONAL VISUAL FIELD TEST (CVF)
OS_FINDINGS: FULL
OD_FINDINGS: FULL

## 2021-11-11 ASSESSMENT — PACHYMETRY
OS_CT_CORRECTION: -4
OS_CT_UM: 600
OD_CT_UM: 600
OD_CT_CORRECTION: -4

## 2021-11-11 ASSESSMENT — CORNEAL EDEMA - FOLDS/STRIAE: OD_FOLDSSTRIAE: 1+

## 2021-11-11 ASSESSMENT — VISUAL ACUITY
OD_BCVA: 20/30
OS_BCVA: 20/30

## 2021-11-11 ASSESSMENT — SUPERFICIAL PUNCTATE KERATITIS (SPK)
OS_SPK: 2+ 3+
OD_SPK: ABSENT

## 2021-11-11 ASSESSMENT — CORNEAL EDEMA - MICROCYSTIC EPITHELIAL EDEMA (MCE): OD_MCE: 1+

## 2021-11-11 ASSESSMENT — TONOMETRY
OS_IOP_MMHG: 11
OD_IOP_MMHG: 11

## 2021-11-11 ASSESSMENT — DRY EYES - PHYSICIAN NOTES: OS_GENERALCOMMENTS: INFERIOR

## 2021-11-11 ASSESSMENT — SPHEQUIV_DERIVED
OD_SPHEQUIV: 2.625
OS_SPHEQUIV: -0.375
OS_SPHEQUIV: -1.5
OD_SPHEQUIV: 0.375
OS_SPHEQUIV: 1.25
OD_SPHEQUIV: 2.875

## 2022-01-20 ENCOUNTER — OFFICE (OUTPATIENT)
Dept: URBAN - METROPOLITAN AREA CLINIC 113 | Facility: CLINIC | Age: 77
Setting detail: OPHTHALMOLOGY
End: 2022-01-20
Payer: MEDICARE

## 2022-01-20 DIAGNOSIS — H40.033: ICD-10-CM

## 2022-01-20 DIAGNOSIS — H40.1112: ICD-10-CM

## 2022-01-20 DIAGNOSIS — H40.1122: ICD-10-CM

## 2022-01-20 DIAGNOSIS — Z96.1: ICD-10-CM

## 2022-01-20 DIAGNOSIS — H16.222: ICD-10-CM

## 2022-01-20 PROCEDURE — 92012 INTRM OPH EXAM EST PATIENT: CPT | Performed by: OPHTHALMOLOGY

## 2022-01-20 ASSESSMENT — SPHEQUIV_DERIVED
OS_SPHEQUIV: 1.25
OS_SPHEQUIV: -1.5
OD_SPHEQUIV: 0.625
OD_SPHEQUIV: 2.875
OD_SPHEQUIV: 2.625
OS_SPHEQUIV: -0.125

## 2022-01-20 ASSESSMENT — KERATOMETRY
OD_K1POWER_DIOPTERS: 43.00
OS_K2POWER_DIOPTERS: 43.75
OS_AXISANGLE_DEGREES: 154
OD_K2POWER_DIOPTERS: 44.00
OS_K1POWER_DIOPTERS: 43.25
OD_AXISANGLE_DEGREES: 178
METHOD_AUTO_MANUAL: AUTO

## 2022-01-20 ASSESSMENT — REFRACTION_AUTOREFRACTION
OS_AXIS: 077
OD_AXIS: 094
OS_SPHERE: +0.75
OD_SPHERE: +1.50
OD_CYLINDER: -1.75
OS_CYLINDER: -1.75

## 2022-01-20 ASSESSMENT — DRY EYES - PHYSICIAN NOTES: OS_GENERALCOMMENTS: INFERIOR

## 2022-01-20 ASSESSMENT — REFRACTION_MANIFEST
OS_AXIS: 090
OS_ADD: +3.00
OD_ADD: +2.25
OS_SPHERE: PLANO
OS_VA1: 20/25
OS_AXIS: 084
OD_VA1: 20/25-
OS_ADD: +2.25
OS_VA1: 20/40
OD_SPHERE: PLANO
OS_CYLINDER: -2.50
OD_VA1: 20/25
OD_SPHERE: +2.75
OD_AXIS: 090
OD_SPHERE: +3.00
OD_AXIS: 074
OD_ADD: +3.00
OD_VA2: 20/20
OD_CYLINDER: -0.25
OS_CYLINDER: -0.50
OS_SPHERE: -0.25
OD_CYLINDER: -0.25
OS_SPHERE: +1.50
OS_VA2: 20/20

## 2022-01-20 ASSESSMENT — TONOMETRY
OS_IOP_MMHG: 11
OS_IOP_MMHG: 12
OD_IOP_MMHG: 11

## 2022-01-20 ASSESSMENT — PACHYMETRY
OD_CT_CORRECTION: -4
OS_CT_UM: 600
OS_CT_CORRECTION: -4
OD_CT_UM: 600

## 2022-01-20 ASSESSMENT — REFRACTION_CURRENTRX
OD_VPRISM_DIRECTION: PROGS
OD_SPHERE: +3.25
OD_CYLINDER: -0.50
OD_ADD: +3.00
OS_OVR_VA: 20/
OD_OVR_VA: 20/
OS_CYLINDER: -0.50
OS_SPHERE: +1.50
OD_AXIS: 099
OS_ADD: +3.00
OS_AXIS: 086
OS_VPRISM_DIRECTION: PROGS

## 2022-01-20 ASSESSMENT — AXIALLENGTH_DERIVED
OS_AL: 23.1147
OD_AL: 22.5226
OD_AL: 22.6117
OS_AL: 23.6407
OD_AL: 23.3509
OS_AL: 24.1912

## 2022-01-20 ASSESSMENT — VISUAL ACUITY
OD_BCVA: 20/30-
OS_BCVA: 20/25

## 2022-01-20 ASSESSMENT — CORNEAL EDEMA - MICROCYSTIC EPITHELIAL EDEMA (MCE): OD_MCE: 1+

## 2022-01-20 ASSESSMENT — CORNEAL EDEMA - FOLDS/STRIAE: OD_FOLDSSTRIAE: 1+

## 2022-01-20 ASSESSMENT — CORNEAL TRAUMA - EPITHELIUM: OS_EPITHELIALDYSTROPHY: IRREGULAR EPITHELIUM

## 2022-01-20 ASSESSMENT — SUPERFICIAL PUNCTATE KERATITIS (SPK)
OS_SPK: 2+ 3+
OD_SPK: ABSENT

## 2022-01-20 ASSESSMENT — CONFRONTATIONAL VISUAL FIELD TEST (CVF)
OS_FINDINGS: FULL
OD_FINDINGS: FULL

## 2022-03-19 ENCOUNTER — OFFICE (OUTPATIENT)
Dept: URBAN - METROPOLITAN AREA CLINIC 113 | Facility: CLINIC | Age: 77
Setting detail: OPHTHALMOLOGY
End: 2022-03-19
Payer: MEDICARE

## 2022-03-19 DIAGNOSIS — H40.033: ICD-10-CM

## 2022-03-19 DIAGNOSIS — H04.123: ICD-10-CM

## 2022-03-19 DIAGNOSIS — H40.1112: ICD-10-CM

## 2022-03-19 PROCEDURE — 99213 OFFICE O/P EST LOW 20 MIN: CPT | Performed by: OPHTHALMOLOGY

## 2022-03-19 ASSESSMENT — PACHYMETRY
OS_CT_UM: 600
OS_CT_CORRECTION: -4
OD_CT_CORRECTION: -4
OD_CT_UM: 600

## 2022-03-19 ASSESSMENT — REFRACTION_AUTOREFRACTION
OD_SPHERE: +1.50
OS_CYLINDER: -1.75
OD_AXIS: 094
OD_CYLINDER: -1.75
OS_AXIS: 077
OS_SPHERE: +0.75

## 2022-03-19 ASSESSMENT — DRY EYES - PHYSICIAN NOTES
OD_GENERALCOMMENTS: + INFERIOR STAINING
OS_GENERALCOMMENTS: + INFERIOR STAINING

## 2022-03-19 ASSESSMENT — SPHEQUIV_DERIVED
OD_SPHEQUIV: 2.875
OS_SPHEQUIV: -0.125
OD_SPHEQUIV: 2.625
OD_SPHEQUIV: 0.625
OS_SPHEQUIV: -1.5
OS_SPHEQUIV: 1.25

## 2022-03-19 ASSESSMENT — REFRACTION_CURRENTRX
OD_AXIS: 099
OS_SPHERE: +1.50
OS_ADD: +3.00
OS_AXIS: 086
OS_OVR_VA: 20/
OD_ADD: +3.00
OS_VPRISM_DIRECTION: PROGS
OS_OVR_VA: 20/
OD_SPHERE: +3.25
OD_CYLINDER: -0.50
OS_CYLINDER: -0.50
OD_VPRISM_DIRECTION: PROGS
OD_OVR_VA: 20/
OS_ADD: +2.50
OD_OVR_VA: 20/
OD_ADD: +2.50

## 2022-03-19 ASSESSMENT — REFRACTION_MANIFEST
OS_ADD: +3.00
OS_VA1: 20/40
OD_VA1: 20/25
OS_ADD: +2.25
OS_SPHERE: -0.25
OS_VA1: 20/25
OS_CYLINDER: -0.50
OD_AXIS: 074
OD_AXIS: 090
OS_AXIS: 084
OS_SPHERE: PLANO
OD_CYLINDER: -0.25
OS_CYLINDER: -2.50
OD_ADD: +2.25
OD_SPHERE: +3.00
OS_SPHERE: +1.50
OD_ADD: +3.00
OS_AXIS: 090
OD_VA1: 20/25-
OD_CYLINDER: -0.25
OD_VA2: 20/20
OS_VA2: 20/20
OD_SPHERE: +2.75
OD_SPHERE: PLANO

## 2022-03-19 ASSESSMENT — AXIALLENGTH_DERIVED
OS_AL: 23.0708
OD_AL: 22.6539
OS_AL: 23.5947
OS_AL: 24.143
OD_AL: 23.396
OD_AL: 22.5645

## 2022-03-19 ASSESSMENT — TONOMETRY
OS_IOP_MMHG: 12
OD_IOP_MMHG: 11
OS_IOP_MMHG: 14
OD_IOP_MMHG: 14

## 2022-03-19 ASSESSMENT — VISUAL ACUITY
OS_BCVA: 20/60
OD_BCVA: 20/40-

## 2022-03-19 ASSESSMENT — CONFRONTATIONAL VISUAL FIELD TEST (CVF)
OD_FINDINGS: FULL
OS_FINDINGS: FULL

## 2022-03-19 ASSESSMENT — KERATOMETRY
OD_K2POWER_DIOPTERS: 44.00
METHOD_AUTO_MANUAL: AUTO
OD_AXISANGLE_DEGREES: 013
OD_K1POWER_DIOPTERS: 42.75
OS_K1POWER_DIOPTERS: 43.50
OS_AXISANGLE_DEGREES: 147
OS_K2POWER_DIOPTERS: 43.75

## 2022-03-19 ASSESSMENT — SUPERFICIAL PUNCTATE KERATITIS (SPK)
OD_SPK: 1+ 2+
OS_SPK: 2+ 3+

## 2022-03-19 ASSESSMENT — CORNEAL TRAUMA - EPITHELIUM: OS_EPITHELIALDYSTROPHY: IRREGULAR EPITHELIUM

## 2022-04-21 ENCOUNTER — OFFICE (OUTPATIENT)
Dept: URBAN - METROPOLITAN AREA CLINIC 113 | Facility: CLINIC | Age: 77
Setting detail: OPHTHALMOLOGY
End: 2022-04-21
Payer: MEDICARE

## 2022-04-21 DIAGNOSIS — H04.123: ICD-10-CM

## 2022-04-21 DIAGNOSIS — H40.033: ICD-10-CM

## 2022-04-21 DIAGNOSIS — H40.1112: ICD-10-CM

## 2022-04-21 PROBLEM — H40.1122 POAG; RIGHT EYE MODERATE, LEFT EYE MODERATE: Status: ACTIVE | Noted: 2021-09-02

## 2022-04-21 PROBLEM — H52.03 HYPEROPIA ; BOTH EYES: Status: ACTIVE | Noted: 2018-09-05

## 2022-04-21 PROBLEM — H11.153 PINGUECULA ; BOTH EYES: Status: ACTIVE | Noted: 2017-09-21

## 2022-04-21 PROBLEM — Z96.1 PSEUDOPHAKIA: Status: ACTIVE | Noted: 2021-10-07

## 2022-04-21 PROBLEM — H47.393 OTHER DISORDERS OF OPTIC DISC; BOTH EYES: Status: ACTIVE | Noted: 2017-09-21

## 2022-04-21 PROCEDURE — 92012 INTRM OPH EXAM EST PATIENT: CPT | Performed by: REGISTERED NURSE

## 2022-04-21 ASSESSMENT — AXIALLENGTH_DERIVED
OD_AL: 22.7895
OS_AL: 23.5947
OS_AL: 22.7041
OD_AL: 22.6539
OS_AL: 23.0708
OD_AL: 23.396
OS_AL: 24.143
OD_AL: 22.5645

## 2022-04-21 ASSESSMENT — REFRACTION_CURRENTRX
OD_ADD: +3.00
OS_ADD: +2.50
OD_AXIS: 099
OD_OVR_VA: 20/
OD_OVR_VA: 20/
OS_CYLINDER: -0.50
OS_OVR_VA: 20/
OD_SPHERE: +3.25
OS_SPHERE: +1.50
OS_OVR_VA: 20/
OD_CYLINDER: -0.50
OS_ADD: +3.00
OD_VPRISM_DIRECTION: PROGS
OS_VPRISM_DIRECTION: PROGS
OS_AXIS: 086
OD_ADD: +2.50

## 2022-04-21 ASSESSMENT — DRY EYES - PHYSICIAN NOTES
OD_GENERALCOMMENTS: + INFERIOR STAINING
OS_GENERALCOMMENTS: + INFERIOR STAINING

## 2022-04-21 ASSESSMENT — REFRACTION_MANIFEST
OS_CYLINDER: -2.50
OD_ADD: +3.00
OS_VA1: 20/40
OD_SPHERE: PLANO
OS_SPHERE: PLANO
OS_VA2: 20/20
OD_CYLINDER: -0.25
OD_VA1: 20/25-
OD_AXIS: 090
OD_VA1: 20/25
OS_ADD: +2.25
OS_SPHERE: -0.25
OD_AXIS: 095
OD_VA2: 20/20
OD_SPHERE: +3.00
OS_CYLINDER: -1.50
OD_CYLINDER: -1.50
OS_AXIS: 075
OS_CYLINDER: -0.50
OD_ADD: +2.25
OD_CYLINDER: -0.25
OS_AXIS: 090
OS_AXIS: 084
OD_SPHERE: +3.00
OS_SPHERE: +3.00
OD_SPHERE: +2.75
OS_SPHERE: +1.50
OS_VA1: 20/25
OS_ADD: +3.00
OD_AXIS: 074

## 2022-04-21 ASSESSMENT — REFRACTION_AUTOREFRACTION
OD_SPHERE: +1.50
OS_SPHERE: +0.75
OD_CYLINDER: -1.75
OD_AXIS: 094
OS_CYLINDER: -1.75
OS_AXIS: 077

## 2022-04-21 ASSESSMENT — SPHEQUIV_DERIVED
OD_SPHEQUIV: 0.625
OS_SPHEQUIV: 2.25
OD_SPHEQUIV: 2.625
OD_SPHEQUIV: 2.875
OS_SPHEQUIV: -0.125
OS_SPHEQUIV: -1.5
OD_SPHEQUIV: 2.25
OS_SPHEQUIV: 1.25

## 2022-04-21 ASSESSMENT — KERATOMETRY
OD_K2POWER_DIOPTERS: 44.00
OD_K1POWER_DIOPTERS: 42.75
OS_K2POWER_DIOPTERS: 43.75
METHOD_AUTO_MANUAL: AUTO
OS_K1POWER_DIOPTERS: 43.50
OD_AXISANGLE_DEGREES: 013
OS_AXISANGLE_DEGREES: 147

## 2022-04-21 ASSESSMENT — PACHYMETRY
OD_CT_UM: 600
OD_CT_CORRECTION: -4
OS_CT_CORRECTION: -4
OS_CT_UM: 600

## 2022-04-21 ASSESSMENT — SUPERFICIAL PUNCTATE KERATITIS (SPK)
OS_SPK: 2+ 3+
OD_SPK: 1+ 2+

## 2022-04-21 ASSESSMENT — TONOMETRY
OS_IOP_MMHG: 10
OD_IOP_MMHG: 12

## 2022-04-21 ASSESSMENT — CONFRONTATIONAL VISUAL FIELD TEST (CVF)
OD_FINDINGS: FULL
OS_FINDINGS: FULL

## 2022-04-21 ASSESSMENT — VISUAL ACUITY
OD_BCVA: 20/40
OS_BCVA: 20/60

## 2022-04-21 ASSESSMENT — CORNEAL TRAUMA - EPITHELIUM: OS_EPITHELIALDYSTROPHY: IRREGULAR EPITHELIUM

## 2022-08-04 ENCOUNTER — OFFICE (OUTPATIENT)
Dept: URBAN - METROPOLITAN AREA CLINIC 113 | Facility: CLINIC | Age: 77
Setting detail: OPHTHALMOLOGY
End: 2022-08-04
Payer: MEDICARE

## 2022-08-04 DIAGNOSIS — H40.033: ICD-10-CM

## 2022-08-04 DIAGNOSIS — H04.123: ICD-10-CM

## 2022-08-04 DIAGNOSIS — H40.1122: ICD-10-CM

## 2022-08-04 DIAGNOSIS — H40.1112: ICD-10-CM

## 2022-08-04 DIAGNOSIS — Z96.1: ICD-10-CM

## 2022-08-04 PROCEDURE — 92020 GONIOSCOPY: CPT | Performed by: OPHTHALMOLOGY

## 2022-08-04 PROCEDURE — 99213 OFFICE O/P EST LOW 20 MIN: CPT | Performed by: OPHTHALMOLOGY

## 2022-08-04 PROCEDURE — 92083 EXTENDED VISUAL FIELD XM: CPT | Performed by: OPHTHALMOLOGY

## 2022-08-04 PROCEDURE — 92133 CPTRZD OPH DX IMG PST SGM ON: CPT | Performed by: OPHTHALMOLOGY

## 2022-08-04 ASSESSMENT — VISUAL ACUITY
OD_BCVA: 20/30
OS_BCVA: 20/30-1

## 2022-08-04 ASSESSMENT — KERATOMETRY
OD_K1POWER_DIOPTERS: 42.75
OD_AXISANGLE_DEGREES: 008
OS_K1POWER_DIOPTERS: 43.25
OS_K2POWER_DIOPTERS: 43.75
OS_AXISANGLE_DEGREES: 163
OD_K2POWER_DIOPTERS: 44.00
METHOD_AUTO_MANUAL: AUTO

## 2022-08-04 ASSESSMENT — REFRACTION_MANIFEST
OD_ADD: +2.25
OS_CYLINDER: -0.50
OS_AXIS: 090
OD_CYLINDER: -1.50
OD_SPHERE: +2.75
OS_VA1: 20/40
OS_SPHERE: -0.25
OS_VA2: 20/20
OD_CYLINDER: -0.25
OD_VA1: 20/25
OS_AXIS: 084
OS_SPHERE: +1.50
OD_SPHERE: +3.00
OD_VA2: 20/20
OS_VA1: 20/25
OD_VA1: 20/25-
OS_SPHERE: +3.00
OD_AXIS: 074
OD_CYLINDER: -0.25
OD_AXIS: 095
OD_AXIS: 090
OD_ADD: +3.00
OS_SPHERE: PLANO
OD_SPHERE: PLANO
OS_CYLINDER: -2.50
OS_CYLINDER: -1.50
OS_AXIS: 075
OD_SPHERE: +3.00
OS_ADD: +3.00
OS_ADD: +2.25

## 2022-08-04 ASSESSMENT — PACHYMETRY
OS_CT_CORRECTION: -4
OD_CT_CORRECTION: -4
OD_CT_UM: 600
OS_CT_UM: 600

## 2022-08-04 ASSESSMENT — REFRACTION_AUTOREFRACTION
OD_AXIS: 104
OS_AXIS: 084
OD_CYLINDER: -2.25
OD_SPHERE: +1.75
OS_CYLINDER: -1.50
OS_SPHERE: +0.50

## 2022-08-04 ASSESSMENT — REFRACTION_CURRENTRX
OD_CYLINDER: -0.50
OD_AXIS: 099
OD_VPRISM_DIRECTION: PROGS
OD_OVR_VA: 20/
OS_ADD: +2.50
OD_ADD: +3.00
OS_VPRISM_DIRECTION: PROGS
OS_CYLINDER: -0.50
OS_SPHERE: +1.50
OD_SPHERE: +3.25
OS_OVR_VA: 20/
OD_OVR_VA: 20/
OD_ADD: +2.50
OS_AXIS: 086
OS_ADD: +3.00
OS_OVR_VA: 20/

## 2022-08-04 ASSESSMENT — AXIALLENGTH_DERIVED
OD_AL: 23.396
OS_AL: 23.1147
OD_AL: 22.6539
OS_AL: 23.6897
OS_AL: 22.7467
OS_AL: 24.1912
OD_AL: 22.7895
OD_AL: 22.5645

## 2022-08-04 ASSESSMENT — SPHEQUIV_DERIVED
OS_SPHEQUIV: -0.25
OS_SPHEQUIV: -1.5
OD_SPHEQUIV: 2.875
OS_SPHEQUIV: 1.25
OS_SPHEQUIV: 2.25
OD_SPHEQUIV: 2.25
OD_SPHEQUIV: 2.625
OD_SPHEQUIV: 0.625

## 2022-08-04 ASSESSMENT — TONOMETRY
OS_IOP_MMHG: 10
OD_IOP_MMHG: 15

## 2022-08-04 ASSESSMENT — SUPERFICIAL PUNCTATE KERATITIS (SPK)
OS_SPK: 2+ 3+
OD_SPK: 1+ 2+

## 2022-08-04 ASSESSMENT — CORNEAL TRAUMA - EPITHELIUM: OS_EPITHELIALDYSTROPHY: IRREGULAR EPITHELIUM

## 2022-08-04 ASSESSMENT — CONFRONTATIONAL VISUAL FIELD TEST (CVF)
OS_FINDINGS: FULL
OD_FINDINGS: FULL

## 2022-08-04 ASSESSMENT — DRY EYES - PHYSICIAN NOTES
OD_GENERALCOMMENTS: + INFERIOR STAINING
OS_GENERALCOMMENTS: + INFERIOR STAINING

## 2022-12-06 ENCOUNTER — OFFICE (OUTPATIENT)
Dept: URBAN - METROPOLITAN AREA CLINIC 113 | Facility: CLINIC | Age: 77
Setting detail: OPHTHALMOLOGY
End: 2022-12-06
Payer: MEDICARE

## 2022-12-06 DIAGNOSIS — Z96.1: ICD-10-CM

## 2022-12-06 DIAGNOSIS — H16.223: ICD-10-CM

## 2022-12-06 DIAGNOSIS — H40.1112: ICD-10-CM

## 2022-12-06 DIAGNOSIS — H40.033: ICD-10-CM

## 2022-12-06 DIAGNOSIS — H40.1122: ICD-10-CM

## 2022-12-06 PROCEDURE — 99213 OFFICE O/P EST LOW 20 MIN: CPT | Performed by: STUDENT IN AN ORGANIZED HEALTH CARE EDUCATION/TRAINING PROGRAM

## 2022-12-06 ASSESSMENT — REFRACTION_AUTOREFRACTION
OD_CYLINDER: -2.50
OD_SPHERE: +2.25
OS_CYLINDER: -2.00
OD_AXIS: 099
OS_SPHERE: +1.25
OS_AXIS: 073

## 2022-12-06 ASSESSMENT — REFRACTION_MANIFEST
OS_VA1: 20/40
OD_VA2: 20/20
OD_ADD: +2.25
OD_SPHERE: +2.75
OS_CYLINDER: -2.50
OS_AXIS: 090
OD_VA1: 20/25
OS_CYLINDER: -0.50
OD_AXIS: 090
OD_SPHERE: +3.00
OS_ADD: +2.25
OD_CYLINDER: -0.25
OS_CYLINDER: -1.50
OD_SPHERE: PLANO
OD_VA1: 20/25-
OS_VA1: 20/25
OD_SPHERE: +3.00
OS_AXIS: 075
OS_AXIS: 084
OS_SPHERE: PLANO
OS_SPHERE: +3.00
OS_SPHERE: -0.25
OS_VA2: 20/20
OD_AXIS: 095
OD_CYLINDER: -0.25
OD_AXIS: 074
OS_ADD: +3.00
OD_CYLINDER: -1.50
OS_SPHERE: +1.50
OD_ADD: +3.00

## 2022-12-06 ASSESSMENT — TONOMETRY
OD_IOP_MMHG: 13
OS_IOP_MMHG: 16

## 2022-12-06 ASSESSMENT — KERATOMETRY
OS_K2POWER_DIOPTERS: 44.00
METHOD_AUTO_MANUAL: AUTO
OS_AXISANGLE_DEGREES: 150
OS_K1POWER_DIOPTERS: 43.00
OD_K1POWER_DIOPTERS: 42.75
OD_AXISANGLE_DEGREES: 011
OD_K2POWER_DIOPTERS: 44.00

## 2022-12-06 ASSESSMENT — SPHEQUIV_DERIVED
OD_SPHEQUIV: 2.25
OS_SPHEQUIV: 2.25
OD_SPHEQUIV: 1
OD_SPHEQUIV: 2.875
OS_SPHEQUIV: -1.5
OD_SPHEQUIV: 2.625
OS_SPHEQUIV: 1.25
OS_SPHEQUIV: 0.25

## 2022-12-06 ASSESSMENT — REFRACTION_CURRENTRX
OS_ADD: +3.00
OD_CYLINDER: -0.50
OS_SPHERE: +1.50
OD_ADD: +2.50
OS_OVR_VA: 20/
OD_SPHERE: +3.25
OS_CYLINDER: -0.50
OD_ADD: +3.00
OS_ADD: +2.50
OD_VPRISM_DIRECTION: PROGS
OD_OVR_VA: 20/
OS_OVR_VA: 20/
OS_AXIS: 086
OS_VPRISM_DIRECTION: PROGS
OD_AXIS: 099
OD_OVR_VA: 20/

## 2022-12-06 ASSESSMENT — CORNEAL TRAUMA - EPITHELIUM: OS_EPITHELIALDYSTROPHY: IRREGULAR EPITHELIUM

## 2022-12-06 ASSESSMENT — AXIALLENGTH_DERIVED
OD_AL: 22.5645
OD_AL: 22.7895
OD_AL: 23.2531
OD_AL: 22.6539
OS_AL: 24.1912
OS_AL: 23.1147
OS_AL: 23.4949
OS_AL: 22.7467

## 2022-12-06 ASSESSMENT — VISUAL ACUITY
OD_BCVA: 20/50
OS_BCVA: 20/40+1

## 2022-12-06 ASSESSMENT — DRY EYES - PHYSICIAN NOTES
OD_GENERALCOMMENTS: + INFERIOR STAINING
OS_GENERALCOMMENTS: + INFERIOR STAINING

## 2022-12-06 ASSESSMENT — SUPERFICIAL PUNCTATE KERATITIS (SPK)
OD_SPK: 4+
OS_SPK: 4+

## 2022-12-06 ASSESSMENT — CONFRONTATIONAL VISUAL FIELD TEST (CVF)
OD_FINDINGS: FULL
OS_FINDINGS: FULL

## 2022-12-06 ASSESSMENT — PACHYMETRY
OS_CT_UM: 600
OS_CT_CORRECTION: -4
OD_CT_UM: 600
OD_CT_CORRECTION: -4

## 2022-12-08 ENCOUNTER — OFFICE (OUTPATIENT)
Dept: URBAN - METROPOLITAN AREA CLINIC 113 | Facility: CLINIC | Age: 77
Setting detail: OPHTHALMOLOGY
End: 2022-12-08
Payer: MEDICARE

## 2022-12-08 DIAGNOSIS — H40.1122: ICD-10-CM

## 2022-12-08 DIAGNOSIS — H16.223: ICD-10-CM

## 2022-12-08 DIAGNOSIS — H40.1112: ICD-10-CM

## 2022-12-08 DIAGNOSIS — Z96.1: ICD-10-CM

## 2022-12-08 DIAGNOSIS — H40.033: ICD-10-CM

## 2022-12-08 PROCEDURE — 99214 OFFICE O/P EST MOD 30 MIN: CPT | Performed by: OPHTHALMOLOGY

## 2022-12-08 PROCEDURE — 92250 FUNDUS PHOTOGRAPHY W/I&R: CPT | Performed by: OPHTHALMOLOGY

## 2022-12-08 ASSESSMENT — REFRACTION_CURRENTRX
OD_SPHERE: +3.25
OS_AXIS: 086
OS_OVR_VA: 20/
OD_ADD: +3.00
OS_VPRISM_DIRECTION: PROGS
OS_ADD: +2.50
OD_ADD: +2.50
OS_OVR_VA: 20/
OS_CYLINDER: -0.50
OD_AXIS: 099
OD_OVR_VA: 20/
OS_ADD: +3.00
OD_VPRISM_DIRECTION: PROGS
OD_OVR_VA: 20/
OS_SPHERE: +1.50
OD_CYLINDER: -0.50

## 2022-12-08 ASSESSMENT — REFRACTION_MANIFEST
OS_CYLINDER: -0.50
OD_AXIS: 090
OS_SPHERE: -0.25
OD_SPHERE: +3.00
OS_AXIS: 075
OD_ADD: +2.25
OS_VA1: 20/40
OS_ADD: +2.25
OD_AXIS: 074
OD_AXIS: 095
OD_VA2: 20/20
OS_VA2: 20/20
OS_AXIS: 090
OS_SPHERE: +3.00
OD_VA1: 20/25
OS_CYLINDER: -2.50
OD_SPHERE: +2.75
OS_VA1: 20/25
OS_SPHERE: PLANO
OS_SPHERE: +1.50
OD_VA1: 20/25-
OD_SPHERE: +3.00
OD_SPHERE: PLANO
OD_CYLINDER: -1.50
OD_CYLINDER: -0.25
OD_CYLINDER: -0.25
OS_CYLINDER: -1.50
OD_ADD: +3.00
OS_AXIS: 084
OS_ADD: +3.00

## 2022-12-08 ASSESSMENT — AXIALLENGTH_DERIVED
OS_AL: 23.1147
OD_AL: 23.3454
OD_AL: 22.8324
OD_AL: 22.6964
OS_AL: 24.1912
OS_AL: 23.5433
OS_AL: 22.7467
OD_AL: 22.6066

## 2022-12-08 ASSESSMENT — SPHEQUIV_DERIVED
OD_SPHEQUIV: 0.875
OS_SPHEQUIV: 1.25
OD_SPHEQUIV: 2.25
OS_SPHEQUIV: -1.5
OS_SPHEQUIV: 0.125
OD_SPHEQUIV: 2.875
OD_SPHEQUIV: 2.625
OS_SPHEQUIV: 2.25

## 2022-12-08 ASSESSMENT — REFRACTION_AUTOREFRACTION
OD_AXIS: 100
OD_CYLINDER: -2.25
OS_AXIS: 072
OS_CYLINDER: -1.75
OS_SPHERE: +1.00
OD_SPHERE: +2.00

## 2022-12-08 ASSESSMENT — VISUAL ACUITY
OD_BCVA: 20/40
OS_BCVA: 20/40

## 2022-12-08 ASSESSMENT — CORNEAL TRAUMA - EPITHELIUM: OS_EPITHELIALDYSTROPHY: IRREGULAR EPITHELIUM

## 2022-12-08 ASSESSMENT — CONFRONTATIONAL VISUAL FIELD TEST (CVF)
OS_FINDINGS: FULL
OD_FINDINGS: FULL

## 2022-12-08 ASSESSMENT — KERATOMETRY
OS_K2POWER_DIOPTERS: 44.00
OD_AXISANGLE_DEGREES: 004
METHOD_AUTO_MANUAL: AUTO
OS_AXISANGLE_DEGREES: 146
OS_K1POWER_DIOPTERS: 43.00
OD_K2POWER_DIOPTERS: 44.00
OD_K1POWER_DIOPTERS: 42.50

## 2022-12-08 ASSESSMENT — TONOMETRY
OD_IOP_MMHG: 15
OS_IOP_MMHG: 15

## 2022-12-08 ASSESSMENT — PACHYMETRY
OS_CT_UM: 600
OD_CT_UM: 600
OD_CT_CORRECTION: -4
OS_CT_CORRECTION: -4

## 2022-12-08 ASSESSMENT — DRY EYES - PHYSICIAN NOTES
OD_GENERALCOMMENTS: + INFERIOR STAINING
OS_GENERALCOMMENTS: + INFERIOR STAINING

## 2022-12-08 ASSESSMENT — SUPERFICIAL PUNCTATE KERATITIS (SPK)
OS_SPK: 4+
OD_SPK: 4+

## 2023-03-07 ENCOUNTER — OFFICE (OUTPATIENT)
Dept: URBAN - METROPOLITAN AREA CLINIC 113 | Facility: CLINIC | Age: 78
Setting detail: OPHTHALMOLOGY
End: 2023-03-07
Payer: MEDICARE

## 2023-03-07 ENCOUNTER — RX ONLY (RX ONLY)
Age: 78
End: 2023-03-07

## 2023-03-07 DIAGNOSIS — H16.223: ICD-10-CM

## 2023-03-07 DIAGNOSIS — H40.1112: ICD-10-CM

## 2023-03-07 DIAGNOSIS — H40.1122: ICD-10-CM

## 2023-03-07 PROCEDURE — 99213 OFFICE O/P EST LOW 20 MIN: CPT | Performed by: STUDENT IN AN ORGANIZED HEALTH CARE EDUCATION/TRAINING PROGRAM

## 2023-03-07 ASSESSMENT — REFRACTION_MANIFEST
OD_SPHERE: +3.00
OD_AXIS: 095
OD_VA1: 20/25-
OD_VA2: 20/20
OS_ADD: +3.00
OD_CYLINDER: -0.25
OD_AXIS: 074
OS_CYLINDER: -1.50
OS_SPHERE: +3.00
OS_VA2: 20/20
OD_CYLINDER: -0.25
OS_AXIS: 084
OS_AXIS: 075
OD_ADD: +3.00
OS_AXIS: 090
OS_SPHERE: -0.25
OD_AXIS: 090
OS_ADD: +2.25
OD_SPHERE: +2.75
OD_SPHERE: +3.00
OS_SPHERE: PLANO
OD_VA1: 20/25
OS_CYLINDER: -0.50
OS_SPHERE: +1.50
OD_SPHERE: PLANO
OD_CYLINDER: -1.50
OS_VA1: 20/40
OD_ADD: +2.25
OS_CYLINDER: -2.50
OS_VA1: 20/25

## 2023-03-07 ASSESSMENT — AXIALLENGTH_DERIVED
OS_AL: 23.6435
OS_AL: 24.143
OD_AL: 22.6066
OD_AL: 22.8324
OD_AL: 22.6964
OS_AL: 23.0708
OS_AL: 22.7041
OD_AL: 23.5378

## 2023-03-07 ASSESSMENT — REFRACTION_CURRENTRX
OS_VPRISM_DIRECTION: PROGS
OD_OVR_VA: 20/
OD_AXIS: 099
OS_CYLINDER: -0.50
OD_ADD: +3.00
OD_ADD: +2.50
OS_SPHERE: +1.50
OD_OVR_VA: 20/
OS_ADD: +3.00
OS_ADD: +2.50
OD_CYLINDER: -0.50
OS_OVR_VA: 20/
OS_OVR_VA: 20/
OS_AXIS: 086
OD_SPHERE: +3.25
OD_VPRISM_DIRECTION: PROGS

## 2023-03-07 ASSESSMENT — REFRACTION_AUTOREFRACTION
OD_CYLINDER: -1.25
OS_AXIS: 080
OD_AXIS: 100
OD_SPHERE: +1.00
OS_SPHERE: +0.75
OS_CYLINDER: -2.00

## 2023-03-07 ASSESSMENT — SPHEQUIV_DERIVED
OD_SPHEQUIV: 0.375
OS_SPHEQUIV: 1.25
OS_SPHEQUIV: 2.25
OD_SPHEQUIV: 2.625
OD_SPHEQUIV: 2.875
OD_SPHEQUIV: 2.25
OS_SPHEQUIV: -0.25
OS_SPHEQUIV: -1.5

## 2023-03-07 ASSESSMENT — TONOMETRY
OD_IOP_MMHG: 10
OD_IOP_MMHG: 12
OS_IOP_MMHG: 13
OS_IOP_MMHG: 12

## 2023-03-07 ASSESSMENT — SUPERFICIAL PUNCTATE KERATITIS (SPK)
OS_SPK: 4+
OD_SPK: 4+

## 2023-03-07 ASSESSMENT — PACHYMETRY
OS_CT_UM: 600
OS_CT_CORRECTION: -4
OD_CT_UM: 600
OD_CT_CORRECTION: -4

## 2023-03-07 ASSESSMENT — KERATOMETRY
OS_AXISANGLE_DEGREES: 156
METHOD_AUTO_MANUAL: AUTO
OD_K2POWER_DIOPTERS: 44.00
OS_K2POWER_DIOPTERS: 44.00
OD_K1POWER_DIOPTERS: 42.50
OS_K1POWER_DIOPTERS: 43.25
OD_AXISANGLE_DEGREES: 001

## 2023-03-07 ASSESSMENT — VISUAL ACUITY
OD_BCVA: 20/50+1
OS_BCVA: 20/25

## 2023-03-07 ASSESSMENT — CONFRONTATIONAL VISUAL FIELD TEST (CVF)
OD_FINDINGS: FULL
OS_FINDINGS: FULL

## 2023-03-07 ASSESSMENT — CORNEAL TRAUMA - EPITHELIUM: OS_EPITHELIALDYSTROPHY: IRREGULAR EPITHELIUM

## 2023-03-07 ASSESSMENT — DRY EYES - PHYSICIAN NOTES
OD_GENERALCOMMENTS: + INFERIOR STAINING
OS_GENERALCOMMENTS: + INFERIOR STAINING

## 2023-04-17 ENCOUNTER — RX ONLY (RX ONLY)
Age: 78
End: 2023-04-17

## 2023-04-17 ENCOUNTER — OFFICE (OUTPATIENT)
Dept: URBAN - METROPOLITAN AREA CLINIC 113 | Facility: CLINIC | Age: 78
Setting detail: OPHTHALMOLOGY
End: 2023-04-17
Payer: MEDICARE

## 2023-04-17 DIAGNOSIS — H40.1112: ICD-10-CM

## 2023-04-17 DIAGNOSIS — H16.223: ICD-10-CM

## 2023-04-17 DIAGNOSIS — H40.1122: ICD-10-CM

## 2023-04-17 PROCEDURE — 92014 COMPRE OPH EXAM EST PT 1/>: CPT | Performed by: STUDENT IN AN ORGANIZED HEALTH CARE EDUCATION/TRAINING PROGRAM

## 2023-04-17 ASSESSMENT — REFRACTION_MANIFEST
OS_SPHERE: -0.25
OD_AXIS: 095
OS_AXIS: 075
OD_AXIS: 090
OS_AXIS: 084
OS_CYLINDER: -2.50
OS_SPHERE: +1.50
OD_SPHERE: +3.00
OS_SPHERE: PLANO
OD_SPHERE: +2.75
OS_AXIS: 090
OS_VA1: 20/25
OD_VA1: 20/25-
OS_CYLINDER: -0.50
OD_SPHERE: PLANO
OD_ADD: +2.25
OS_SPHERE: +3.00
OS_VA1: 20/40
OS_ADD: +3.00
OD_AXIS: 074
OD_ADD: +3.00
OD_CYLINDER: -1.50
OD_SPHERE: +3.00
OD_VA1: 20/25
OS_VA2: 20/20
OD_VA2: 20/20
OS_ADD: +2.25
OD_CYLINDER: -0.25
OD_CYLINDER: -0.25
OS_CYLINDER: -1.50

## 2023-04-17 ASSESSMENT — REFRACTION_AUTOREFRACTION
OD_AXIS: 093
OD_SPHERE: +1.00
OS_CYLINDER: -2.00
OS_AXIS: 083
OS_SPHERE: +0.75
OD_CYLINDER: -1.50

## 2023-04-17 ASSESSMENT — SUPERFICIAL PUNCTATE KERATITIS (SPK)
OS_SPK: 4+
OD_SPK: 4+

## 2023-04-17 ASSESSMENT — REFRACTION_CURRENTRX
OS_CYLINDER: -0.50
OS_ADD: +3.00
OS_OVR_VA: 20/
OS_VPRISM_DIRECTION: PROGS
OS_SPHERE: +1.50
OD_VPRISM_DIRECTION: PROGS
OD_AXIS: 099
OS_OVR_VA: 20/
OD_OVR_VA: 20/
OS_AXIS: 086
OD_ADD: +2.50
OS_ADD: +2.50
OD_CYLINDER: -0.50
OD_SPHERE: +3.25
OD_ADD: +3.00
OD_OVR_VA: 20/

## 2023-04-17 ASSESSMENT — KERATOMETRY
OD_K1POWER_DIOPTERS: 42.75
OS_K1POWER_DIOPTERS: 43.00
OD_K2POWER_DIOPTERS: 44.00
METHOD_AUTO_MANUAL: AUTO
OS_AXISANGLE_DEGREES: 162
OD_AXISANGLE_DEGREES: 176
OS_K2POWER_DIOPTERS: 44.00

## 2023-04-17 ASSESSMENT — SPHEQUIV_DERIVED
OS_SPHEQUIV: -0.25
OS_SPHEQUIV: -1.5
OS_SPHEQUIV: 2.25
OD_SPHEQUIV: 2.875
OD_SPHEQUIV: 0.25
OD_SPHEQUIV: 2.25
OS_SPHEQUIV: 1.25
OD_SPHEQUIV: 2.625

## 2023-04-17 ASSESSMENT — AXIALLENGTH_DERIVED
OS_AL: 23.1147
OD_AL: 23.5405
OS_AL: 24.1912
OD_AL: 22.7895
OD_AL: 22.5645
OS_AL: 22.7467
OD_AL: 22.6539
OS_AL: 23.6897

## 2023-04-17 ASSESSMENT — TONOMETRY: OD_IOP_MMHG: 10

## 2023-04-17 ASSESSMENT — CONFRONTATIONAL VISUAL FIELD TEST (CVF)
OS_FINDINGS: FULL
OD_FINDINGS: FULL

## 2023-04-17 ASSESSMENT — PACHYMETRY
OD_CT_CORRECTION: -4
OS_CT_UM: 600
OD_CT_UM: 600
OS_CT_CORRECTION: -4

## 2023-04-17 ASSESSMENT — CORNEAL TRAUMA - EPITHELIUM: OS_EPITHELIALDYSTROPHY: IRREGULAR EPITHELIUM

## 2023-04-17 ASSESSMENT — DRY EYES - PHYSICIAN NOTES
OD_GENERALCOMMENTS: + INFERIOR STAINING
OS_GENERALCOMMENTS: + INFERIOR STAINING

## 2023-04-17 ASSESSMENT — VISUAL ACUITY
OS_BCVA: 20/30+1
OD_BCVA: 20/40

## 2023-08-16 ENCOUNTER — OFFICE (OUTPATIENT)
Dept: URBAN - METROPOLITAN AREA CLINIC 113 | Facility: CLINIC | Age: 78
Setting detail: OPHTHALMOLOGY
End: 2023-08-16
Payer: MEDICARE

## 2023-08-16 DIAGNOSIS — H40.1112: ICD-10-CM

## 2023-08-16 DIAGNOSIS — H40.1123: ICD-10-CM

## 2023-08-16 DIAGNOSIS — H16.223: ICD-10-CM

## 2023-08-16 PROCEDURE — 99213 OFFICE O/P EST LOW 20 MIN: CPT | Performed by: STUDENT IN AN ORGANIZED HEALTH CARE EDUCATION/TRAINING PROGRAM

## 2023-08-16 PROCEDURE — 92133 CPTRZD OPH DX IMG PST SGM ON: CPT | Performed by: STUDENT IN AN ORGANIZED HEALTH CARE EDUCATION/TRAINING PROGRAM

## 2023-08-16 PROCEDURE — 92083 EXTENDED VISUAL FIELD XM: CPT | Performed by: STUDENT IN AN ORGANIZED HEALTH CARE EDUCATION/TRAINING PROGRAM

## 2023-08-16 ASSESSMENT — AXIALLENGTH_DERIVED
OD_AL: 22.8324
OS_AL: 23.4977
OD_AL: 22.6964
OD_AL: 23.4412
OS_AL: 23.0708
OD_AL: 22.6066
OS_AL: 22.7041
OS_AL: 24.143

## 2023-08-16 ASSESSMENT — REFRACTION_AUTOREFRACTION
OS_AXIS: 081
OS_CYLINDER: -1.75
OD_SPHERE: +1.50
OD_CYLINDER: -1.75
OD_AXIS: 099
OS_SPHERE: +1.00

## 2023-08-16 ASSESSMENT — REFRACTION_MANIFEST
OS_CYLINDER: -1.50
OD_AXIS: 074
OD_ADD: +2.25
OD_CYLINDER: -0.25
OD_SPHERE: +3.00
OD_AXIS: 095
OD_SPHERE: +2.75
OD_VA1: 20/25-
OD_CYLINDER: -1.50
OD_SPHERE: +3.00
OD_CYLINDER: -0.25
OS_CYLINDER: -2.50
OS_VA2: 20/20
OS_AXIS: 090
OS_SPHERE: +3.00
OD_AXIS: 090
OD_ADD: +3.00
OD_VA1: 20/25
OS_SPHERE: +1.50
OS_SPHERE: -0.25
OS_CYLINDER: -0.50
OS_ADD: +2.25
OS_AXIS: 084
OS_SPHERE: PLANO
OS_VA1: 20/25
OS_VA1: 20/40
OS_ADD: +3.00
OD_VA2: 20/20
OS_AXIS: 075
OD_SPHERE: PLANO

## 2023-08-16 ASSESSMENT — REFRACTION_CURRENTRX
OS_OVR_VA: 20/
OD_ADD: +2.50
OS_SPHERE: +0.75
OS_AXIS: 086
OD_CYLINDER: -1.00
OD_OVR_VA: 20/
OS_ADD: +2.50
OD_VPRISM_DIRECTION: PROGS
OS_CYLINDER: -1.00
OD_SPHERE: +1.00
OD_AXIS: 087
OS_VPRISM_DIRECTION: PROGS
OS_ADD: +2.50
OS_OVR_VA: 20/
OD_ADD: +2.50
OD_OVR_VA: 20/

## 2023-08-16 ASSESSMENT — SPHEQUIV_DERIVED
OD_SPHEQUIV: 2.25
OD_SPHEQUIV: 0.625
OD_SPHEQUIV: 2.875
OS_SPHEQUIV: 2.25
OS_SPHEQUIV: 1.25
OD_SPHEQUIV: 2.625
OS_SPHEQUIV: -1.5
OS_SPHEQUIV: 0.125

## 2023-08-16 ASSESSMENT — TONOMETRY
OS_IOP_MMHG: 12
OD_IOP_MMHG: 12
OD_IOP_MMHG: 10
OS_IOP_MMHG: 10

## 2023-08-16 ASSESSMENT — VISUAL ACUITY
OS_BCVA: 20/30-
OD_BCVA: 20/30-

## 2023-08-16 ASSESSMENT — DRY EYES - PHYSICIAN NOTES
OD_GENERALCOMMENTS: + INFERIOR STAINING
OS_GENERALCOMMENTS: + INFERIOR STAINING

## 2023-08-16 ASSESSMENT — KERATOMETRY
OD_K2POWER_DIOPTERS: 44.00
METHOD_AUTO_MANUAL: AUTO
OS_K2POWER_DIOPTERS: 44.00
OS_AXISANGLE_DEGREES: 150
OS_K1POWER_DIOPTERS: 43.25
OD_K1POWER_DIOPTERS: 42.50
OD_AXISANGLE_DEGREES: 007

## 2023-08-16 ASSESSMENT — PACHYMETRY
OD_CT_UM: 600
OS_CT_CORRECTION: -4
OS_CT_UM: 600
OD_CT_CORRECTION: -4

## 2023-08-16 ASSESSMENT — SUPERFICIAL PUNCTATE KERATITIS (SPK)
OD_SPK: 4+
OS_SPK: 4+

## 2023-08-16 ASSESSMENT — CONFRONTATIONAL VISUAL FIELD TEST (CVF)
OS_FINDINGS: FULL
OD_FINDINGS: FULL

## 2023-08-16 ASSESSMENT — CORNEAL TRAUMA - EPITHELIUM: OS_EPITHELIALDYSTROPHY: IRREGULAR EPITHELIUM

## 2023-11-14 ENCOUNTER — OFFICE (OUTPATIENT)
Dept: URBAN - METROPOLITAN AREA CLINIC 113 | Facility: CLINIC | Age: 78
Setting detail: OPHTHALMOLOGY
End: 2023-11-14
Payer: MEDICARE

## 2023-11-14 DIAGNOSIS — H40.1112: ICD-10-CM

## 2023-11-14 DIAGNOSIS — H40.1123: ICD-10-CM

## 2023-11-14 PROCEDURE — 99213 OFFICE O/P EST LOW 20 MIN: CPT | Performed by: STUDENT IN AN ORGANIZED HEALTH CARE EDUCATION/TRAINING PROGRAM

## 2023-11-14 ASSESSMENT — REFRACTION_CURRENTRX
OD_VPRISM_DIRECTION: PROGS
OD_SPHERE: +1.00
OS_ADD: +2.50
OS_OVR_VA: 20/
OD_CYLINDER: -1.00
OS_SPHERE: +0.75
OD_ADD: +2.50
OS_OVR_VA: 20/
OS_ADD: +2.50
OS_CYLINDER: -1.00
OD_OVR_VA: 20/
OS_AXIS: 083
OD_ADD: +2.50
OS_VPRISM_DIRECTION: PROGS
OD_AXIS: 087
OD_OVR_VA: 20/

## 2023-11-14 ASSESSMENT — REFRACTION_AUTOREFRACTION
OS_SPHERE: +1.00
OD_SPHERE: +1.50
OS_CYLINDER: -2.00
OD_CYLINDER: -1.75
OS_AXIS: 079
OD_AXIS: 095

## 2023-11-14 ASSESSMENT — SUPERFICIAL PUNCTATE KERATITIS (SPK)
OD_SPK: 4+
OS_SPK: 4+

## 2023-11-14 ASSESSMENT — REFRACTION_MANIFEST
OS_VA2: 20/20
OD_AXIS: 095
OS_ADD: +3.00
OS_SPHERE: +3.00
OD_VA1: 20/25-
OD_CYLINDER: -0.25
OS_SPHERE: -0.25
OS_CYLINDER: -2.50
OS_VA1: 20/25
OS_CYLINDER: -1.50
OS_AXIS: 090
OD_CYLINDER: -1.50
OD_AXIS: 074
OS_CYLINDER: -0.50
OS_AXIS: 084
OD_AXIS: 090
OD_SPHERE: +2.75
OS_VA1: 20/40
OD_SPHERE: PLANO
OD_ADD: +2.25
OS_AXIS: 075
OD_SPHERE: +3.00
OS_ADD: +2.25
OD_VA1: 20/25
OS_SPHERE: PLANO
OD_ADD: +3.00
OD_SPHERE: +3.00
OD_VA2: 20/20
OD_CYLINDER: -0.25
OS_SPHERE: +1.50

## 2023-11-14 ASSESSMENT — CORNEAL TRAUMA - EPITHELIUM: OS_EPITHELIALDYSTROPHY: IRREGULAR EPITHELIUM

## 2023-11-14 ASSESSMENT — CONFRONTATIONAL VISUAL FIELD TEST (CVF)
OD_FINDINGS: FULL
OS_FINDINGS: FULL

## 2023-11-14 ASSESSMENT — SPHEQUIV_DERIVED
OD_SPHEQUIV: 0.625
OD_SPHEQUIV: 2.25
OD_SPHEQUIV: 2.875
OD_SPHEQUIV: 2.625
OS_SPHEQUIV: 1.25
OS_SPHEQUIV: 2.25
OS_SPHEQUIV: 0
OS_SPHEQUIV: -1.5

## 2023-11-14 ASSESSMENT — DRY EYES - PHYSICIAN NOTES
OD_GENERALCOMMENTS: + INFERIOR STAINING
OS_GENERALCOMMENTS: + INFERIOR STAINING

## 2024-01-22 ENCOUNTER — OUTPATIENT (OUTPATIENT)
Dept: OUTPATIENT SERVICES | Facility: HOSPITAL | Age: 79
LOS: 1 days | End: 2024-01-22

## 2024-01-22 ENCOUNTER — APPOINTMENT (OUTPATIENT)
Dept: NUCLEAR MEDICINE | Facility: CLINIC | Age: 79
End: 2024-01-22
Payer: MEDICARE

## 2024-01-22 ENCOUNTER — APPOINTMENT (OUTPATIENT)
Dept: NUCLEAR MEDICINE | Facility: CLINIC | Age: 79
End: 2024-01-22

## 2024-01-22 DIAGNOSIS — G20.C PARKINSONISM, UNSPECIFIED: ICD-10-CM

## 2024-01-22 PROCEDURE — 78803 RP LOCLZJ TUM SPECT 1 AREA: CPT | Mod: 26,MH

## 2024-03-04 ENCOUNTER — OFFICE (OUTPATIENT)
Dept: URBAN - METROPOLITAN AREA CLINIC 113 | Facility: CLINIC | Age: 79
Setting detail: OPHTHALMOLOGY
End: 2024-03-04
Payer: MEDICARE

## 2024-03-04 DIAGNOSIS — H16.223: ICD-10-CM

## 2024-03-04 DIAGNOSIS — H40.033: ICD-10-CM

## 2024-03-04 DIAGNOSIS — H40.1112: ICD-10-CM

## 2024-03-04 DIAGNOSIS — H40.1123: ICD-10-CM

## 2024-03-04 PROCEDURE — 92250 FUNDUS PHOTOGRAPHY W/I&R: CPT | Performed by: STUDENT IN AN ORGANIZED HEALTH CARE EDUCATION/TRAINING PROGRAM

## 2024-03-04 PROCEDURE — 92014 COMPRE OPH EXAM EST PT 1/>: CPT | Performed by: STUDENT IN AN ORGANIZED HEALTH CARE EDUCATION/TRAINING PROGRAM

## 2024-03-04 ASSESSMENT — REFRACTION_CURRENTRX
OD_OVR_VA: 20/
OD_SPHERE: +1.00
OS_ADD: +2.50
OD_ADD: +2.50
OS_ADD: +2.50
OD_VPRISM_DIRECTION: PROGS
OS_AXIS: 083
OS_VPRISM_DIRECTION: PROGS
OS_CYLINDER: -1.00
OS_OVR_VA: 20/
OS_OVR_VA: 20/
OS_SPHERE: +0.75
OD_AXIS: 087
OD_ADD: +2.50
OD_CYLINDER: -1.00
OD_OVR_VA: 20/

## 2024-03-04 ASSESSMENT — REFRACTION_MANIFEST
OD_ADD: +3.00
OD_ADD: +2.25
OS_AXIS: 075
OD_AXIS: 090
OD_VA1: 20/25-
OD_VA2: 20/20
OD_SPHERE: PLANO
OD_AXIS: 095
OS_SPHERE: PLANO
OS_CYLINDER: -0.50
OD_AXIS: 074
OS_CYLINDER: -1.50
OD_SPHERE: +2.75
OS_VA1: 20/25
OD_SPHERE: +3.00
OS_SPHERE: +1.50
OD_CYLINDER: -1.50
OS_VA2: 20/20
OS_ADD: +3.00
OS_VA1: 20/40
OS_SPHERE: +3.00
OS_CYLINDER: -2.50
OS_ADD: +2.25
OS_SPHERE: -0.25
OS_AXIS: 084
OD_VA1: 20/25
OD_CYLINDER: -0.25
OD_CYLINDER: -0.25
OS_AXIS: 090
OD_SPHERE: +3.00

## 2024-03-04 ASSESSMENT — SPHEQUIV_DERIVED
OD_SPHEQUIV: 2.875
OD_SPHEQUIV: 2.625
OS_SPHEQUIV: 2.25
OS_SPHEQUIV: -1.5
OS_SPHEQUIV: 1.25
OD_SPHEQUIV: 2.25

## 2024-07-15 ENCOUNTER — OFFICE (OUTPATIENT)
Dept: URBAN - METROPOLITAN AREA CLINIC 113 | Facility: CLINIC | Age: 79
Setting detail: OPHTHALMOLOGY
End: 2024-07-15
Payer: MEDICARE

## 2024-07-15 DIAGNOSIS — H16.223: ICD-10-CM

## 2024-07-15 DIAGNOSIS — H40.033: ICD-10-CM

## 2024-07-15 DIAGNOSIS — H40.1123: ICD-10-CM

## 2024-07-15 DIAGNOSIS — H40.1112: ICD-10-CM

## 2024-07-15 DIAGNOSIS — H11.153: ICD-10-CM

## 2024-07-15 PROCEDURE — 92133 CPTRZD OPH DX IMG PST SGM ON: CPT | Performed by: STUDENT IN AN ORGANIZED HEALTH CARE EDUCATION/TRAINING PROGRAM

## 2024-07-15 PROCEDURE — 99213 OFFICE O/P EST LOW 20 MIN: CPT | Performed by: STUDENT IN AN ORGANIZED HEALTH CARE EDUCATION/TRAINING PROGRAM

## 2024-07-15 PROCEDURE — 92083 EXTENDED VISUAL FIELD XM: CPT | Performed by: STUDENT IN AN ORGANIZED HEALTH CARE EDUCATION/TRAINING PROGRAM

## 2024-07-15 ASSESSMENT — CONFRONTATIONAL VISUAL FIELD TEST (CVF)
OD_FINDINGS: FULL
OS_FINDINGS: FULL

## 2024-08-19 ENCOUNTER — RX ONLY (RX ONLY)
Age: 79
End: 2024-08-19

## 2024-08-19 ENCOUNTER — OFFICE (OUTPATIENT)
Dept: URBAN - METROPOLITAN AREA CLINIC 113 | Facility: CLINIC | Age: 79
Setting detail: OPHTHALMOLOGY
End: 2024-08-19
Payer: MEDICARE

## 2024-08-19 DIAGNOSIS — H16.223: ICD-10-CM

## 2024-08-19 PROCEDURE — 99213 OFFICE O/P EST LOW 20 MIN: CPT | Performed by: STUDENT IN AN ORGANIZED HEALTH CARE EDUCATION/TRAINING PROGRAM

## 2024-08-19 ASSESSMENT — CONFRONTATIONAL VISUAL FIELD TEST (CVF)
OD_FINDINGS: FULL
OS_FINDINGS: FULL

## 2024-09-24 ENCOUNTER — NON-APPOINTMENT (OUTPATIENT)
Age: 79
End: 2024-09-24

## 2024-09-24 ENCOUNTER — APPOINTMENT (OUTPATIENT)
Dept: OPHTHALMOLOGY | Facility: CLINIC | Age: 79
End: 2024-09-24
Payer: MEDICARE

## 2024-09-24 PROCEDURE — 76514 ECHO EXAM OF EYE THICKNESS: CPT

## 2024-09-24 PROCEDURE — 92004 COMPRE OPH EXAM NEW PT 1/>: CPT

## 2024-10-01 ENCOUNTER — TRANSCRIPTION ENCOUNTER (OUTPATIENT)
Age: 79
End: 2024-10-01

## 2024-10-29 ENCOUNTER — APPOINTMENT (OUTPATIENT)
Dept: OPHTHALMOLOGY | Facility: CLINIC | Age: 79
End: 2024-10-29
Payer: MEDICARE

## 2024-10-29 ENCOUNTER — NON-APPOINTMENT (OUTPATIENT)
Age: 79
End: 2024-10-29

## 2024-10-29 PROCEDURE — 92012 INTRM OPH EXAM EST PATIENT: CPT

## 2024-10-29 PROCEDURE — 76514 ECHO EXAM OF EYE THICKNESS: CPT

## 2024-11-25 ENCOUNTER — APPOINTMENT (OUTPATIENT)
Dept: OPHTHALMOLOGY | Facility: CLINIC | Age: 79
End: 2024-11-25
Payer: MEDICARE

## 2024-11-25 ENCOUNTER — NON-APPOINTMENT (OUTPATIENT)
Age: 79
End: 2024-11-25

## 2024-11-25 PROCEDURE — 92012 INTRM OPH EXAM EST PATIENT: CPT

## 2024-11-25 PROCEDURE — 92025 CPTRIZED CORNEAL TOPOGRAPHY: CPT

## 2024-11-25 PROCEDURE — 76514 ECHO EXAM OF EYE THICKNESS: CPT

## 2025-02-20 ENCOUNTER — APPOINTMENT (OUTPATIENT)
Dept: OPHTHALMOLOGY | Facility: CLINIC | Age: 80
End: 2025-02-20
Payer: MEDICARE

## 2025-02-20 ENCOUNTER — NON-APPOINTMENT (OUTPATIENT)
Age: 80
End: 2025-02-20

## 2025-02-20 PROCEDURE — 92014 COMPRE OPH EXAM EST PT 1/>: CPT

## 2025-02-26 ENCOUNTER — NON-APPOINTMENT (OUTPATIENT)
Age: 80
End: 2025-02-26

## 2025-02-26 ENCOUNTER — APPOINTMENT (OUTPATIENT)
Dept: OPHTHALMOLOGY | Facility: CLINIC | Age: 80
End: 2025-02-26
Payer: MEDICARE

## 2025-02-26 PROCEDURE — 76514 ECHO EXAM OF EYE THICKNESS: CPT

## 2025-02-26 PROCEDURE — 92012 INTRM OPH EXAM EST PATIENT: CPT

## 2025-04-28 ENCOUNTER — APPOINTMENT (OUTPATIENT)
Dept: OPHTHALMOLOGY | Facility: CLINIC | Age: 80
End: 2025-04-28
Payer: MEDICARE

## 2025-04-28 ENCOUNTER — NON-APPOINTMENT (OUTPATIENT)
Age: 80
End: 2025-04-28

## 2025-04-28 PROCEDURE — 92014 COMPRE OPH EXAM EST PT 1/>: CPT

## 2025-04-28 PROCEDURE — 92025 CPTRIZED CORNEAL TOPOGRAPHY: CPT

## 2025-04-28 PROCEDURE — 76514 ECHO EXAM OF EYE THICKNESS: CPT

## 2025-05-08 ENCOUNTER — RESULT REVIEW (OUTPATIENT)
Age: 80
End: 2025-05-08

## 2025-05-08 ENCOUNTER — OUTPATIENT (OUTPATIENT)
Dept: OUTPATIENT SERVICES | Facility: HOSPITAL | Age: 80
LOS: 1 days | End: 2025-05-08
Payer: MEDICARE

## 2025-05-08 ENCOUNTER — TRANSCRIPTION ENCOUNTER (OUTPATIENT)
Age: 80
End: 2025-05-08

## 2025-05-08 ENCOUNTER — APPOINTMENT (OUTPATIENT)
Dept: OPHTHALMOLOGY | Facility: HOSPITAL | Age: 80
End: 2025-05-08
Payer: MEDICARE

## 2025-05-08 VITALS
WEIGHT: 124.78 LBS | SYSTOLIC BLOOD PRESSURE: 136 MMHG | OXYGEN SATURATION: 97 % | HEART RATE: 74 BPM | HEIGHT: 58 IN | TEMPERATURE: 97 F | DIASTOLIC BLOOD PRESSURE: 84 MMHG | RESPIRATION RATE: 23 BRPM

## 2025-05-08 VITALS
RESPIRATION RATE: 18 BRPM | DIASTOLIC BLOOD PRESSURE: 69 MMHG | OXYGEN SATURATION: 97 % | SYSTOLIC BLOOD PRESSURE: 161 MMHG | HEART RATE: 74 BPM

## 2025-05-08 DIAGNOSIS — I25.10 ATHEROSCLEROTIC HEART DISEASE OF NATIVE CORONARY ARTERY WITHOUT ANGINA PECTORIS: Chronic | ICD-10-CM

## 2025-05-08 DIAGNOSIS — H18.11 BULLOUS KERATOPATHY, RIGHT EYE: ICD-10-CM

## 2025-05-08 DIAGNOSIS — Z98.891 HISTORY OF UTERINE SCAR FROM PREVIOUS SURGERY: Chronic | ICD-10-CM

## 2025-05-08 DIAGNOSIS — Z98.890 OTHER SPECIFIED POSTPROCEDURAL STATES: Chronic | ICD-10-CM

## 2025-05-08 PROCEDURE — 87070 CULTURE OTHR SPECIMN AEROBIC: CPT

## 2025-05-08 PROCEDURE — 88305 TISSUE EXAM BY PATHOLOGIST: CPT

## 2025-05-08 PROCEDURE — 88305 TISSUE EXAM BY PATHOLOGIST: CPT | Mod: 26

## 2025-05-08 PROCEDURE — 65756 CORNEAL TRNSPL ENDOTHELIAL: CPT | Mod: RT

## 2025-05-08 PROCEDURE — 88312 SPECIAL STAINS GROUP 1: CPT | Mod: 26

## 2025-05-08 PROCEDURE — 88312 SPECIAL STAINS GROUP 1: CPT

## 2025-05-08 PROCEDURE — 87077 CULTURE AEROBIC IDENTIFY: CPT

## 2025-05-08 PROCEDURE — 87075 CULTR BACTERIA EXCEPT BLOOD: CPT

## 2025-05-08 PROCEDURE — V2785: CPT

## 2025-05-08 RX ORDER — CARBIDOPA/LEVODOPA 25MG-100MG
1 TABLET ORAL
Refills: 0 | DISCHARGE

## 2025-05-08 RX ORDER — GABAPENTIN 400 MG/1
1 CAPSULE ORAL
Refills: 0 | DISCHARGE

## 2025-05-08 RX ORDER — TIZANIDINE 4 MG/1
2 TABLET ORAL
Refills: 0 | DISCHARGE

## 2025-05-08 RX ORDER — ROPINIROLE 5 MG/1
1 TABLET, FILM COATED ORAL
Refills: 0 | DISCHARGE

## 2025-05-08 RX ORDER — ISOSORBIDE MONONITRATE 60 MG/1
1 TABLET, EXTENDED RELEASE ORAL
Refills: 0 | DISCHARGE

## 2025-05-08 RX ORDER — TRAVOPROST OPHTHALMIC SOLUTION, 0.004% 0.04 MG/ML
1 SOLUTION/ DROPS OPHTHALMIC
Refills: 0 | DISCHARGE

## 2025-05-08 RX ORDER — TIMOLOL MALEATE 6.8 MG/ML
1 SOLUTION OPHTHALMIC
Refills: 0 | DISCHARGE

## 2025-05-08 RX ORDER — AMLODIPINE BESYLATE 10 MG/1
1 TABLET ORAL
Refills: 0 | DISCHARGE

## 2025-05-08 RX ORDER — ENTACAPONE 200 MG/1
1 TABLET, FILM COATED ORAL
Refills: 0 | DISCHARGE

## 2025-05-08 RX ORDER — DORZOLAMIDE 20 MG/ML
1 SOLUTION/ DROPS OPHTHALMIC
Refills: 0 | DISCHARGE

## 2025-05-08 RX ORDER — LATANOPROST PF 0.05 MG/ML
1 SOLUTION/ DROPS OPHTHALMIC
Refills: 0 | DISCHARGE

## 2025-05-08 NOTE — ASU DISCHARGE PLAN (ADULT/PEDIATRIC) - NS MD DC FALL RISK RISK
For information on Fall & Injury Prevention, visit: [email or web address] OR  [email or web address] OR  https://www.cdc.gov/steadi/patient.html

## 2025-05-08 NOTE — ASU PATIENT PROFILE, ADULT - NSICDXPASTMEDICALHX_GEN_ALL_CORE_FT
PAST MEDICAL HISTORY:  CAD (coronary artery disease)     Carotid artery occlusion     H/O Parkinson's disease     H/O: glaucoma     HLD (hyperlipidemia)     HTN (hypertension)

## 2025-05-08 NOTE — ASU PATIENT PROFILE, ADULT - FALL HARM RISK - HARM RISK INTERVENTIONS

## 2025-05-08 NOTE — ASU DISCHARGE PLAN (ADULT/PEDIATRIC) - FINANCIAL ASSISTANCE
[facility] provides services at a reduced cost to those who are determined to be eligible through [facility]’s financial assistance program. Information regarding [facility]’s financial assistance program can be found by going to [email or web address] or by calling [phone].

## 2025-05-08 NOTE — ASU PATIENT PROFILE, ADULT - NSICDXPASTSURGICALHX_GEN_ALL_CORE_FT
PAST SURGICAL HISTORY:  Arteriosclerotic heart disease (ASHD)     H/O colonoscopy     S/P      S/P coronary angiogram

## 2025-05-08 NOTE — ASU PATIENT PROFILE, ADULT - VISION (WITH CORRECTIVE LENSES IF THE PATIENT USUALLY WEARS THEM):
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

## 2025-05-08 NOTE — ASU PREOP CHECKLIST - BP NONINVASIVE SYSTOLIC (MM HG)
Patient states her pain is more constant now & bad enough to make her throw up  Her cardiac catherization is Mon  Wants to know from Dr Cabello Check if she needs to go to the ER or wait until Mon when she has her procedure  Please call to advise 
Spoke with Pt and Advised Pt to go to the ER, ADT placed for Pt to go to [facility]
136

## 2025-05-09 ENCOUNTER — APPOINTMENT (OUTPATIENT)
Dept: OPHTHALMOLOGY | Facility: CLINIC | Age: 80
End: 2025-05-09
Payer: MEDICARE

## 2025-05-09 ENCOUNTER — NON-APPOINTMENT (OUTPATIENT)
Age: 80
End: 2025-05-09

## 2025-05-09 PROCEDURE — 99024 POSTOP FOLLOW-UP VISIT: CPT

## 2025-05-12 ENCOUNTER — NON-APPOINTMENT (OUTPATIENT)
Age: 80
End: 2025-05-12

## 2025-05-12 ENCOUNTER — APPOINTMENT (OUTPATIENT)
Dept: OPHTHALMOLOGY | Facility: CLINIC | Age: 80
End: 2025-05-12
Payer: MEDICARE

## 2025-05-12 PROBLEM — I65.29 OCCLUSION AND STENOSIS OF UNSPECIFIED CAROTID ARTERY: Chronic | Status: ACTIVE | Noted: 2025-05-08

## 2025-05-12 PROBLEM — Z86.69 PERSONAL HISTORY OF OTHER DISEASES OF THE NERVOUS SYSTEM AND SENSE ORGANS: Chronic | Status: ACTIVE | Noted: 2025-05-08

## 2025-05-12 PROBLEM — I10 ESSENTIAL (PRIMARY) HYPERTENSION: Chronic | Status: ACTIVE | Noted: 2025-05-08

## 2025-05-12 PROBLEM — E78.5 HYPERLIPIDEMIA, UNSPECIFIED: Chronic | Status: ACTIVE | Noted: 2025-05-08

## 2025-05-12 PROBLEM — I25.10 ATHEROSCLEROTIC HEART DISEASE OF NATIVE CORONARY ARTERY WITHOUT ANGINA PECTORIS: Chronic | Status: ACTIVE | Noted: 2025-05-08

## 2025-05-12 PROCEDURE — 99024 POSTOP FOLLOW-UP VISIT: CPT

## 2025-05-19 ENCOUNTER — APPOINTMENT (OUTPATIENT)
Dept: OPHTHALMOLOGY | Facility: CLINIC | Age: 80
End: 2025-05-19
Payer: MEDICARE

## 2025-05-19 ENCOUNTER — NON-APPOINTMENT (OUTPATIENT)
Age: 80
End: 2025-05-19

## 2025-05-19 PROCEDURE — 99024 POSTOP FOLLOW-UP VISIT: CPT

## 2025-05-19 PROCEDURE — 92025 CPTRIZED CORNEAL TOPOGRAPHY: CPT

## 2025-06-05 ENCOUNTER — APPOINTMENT (OUTPATIENT)
Dept: OPHTHALMOLOGY | Facility: CLINIC | Age: 80
End: 2025-06-05
Payer: MEDICARE

## 2025-06-05 ENCOUNTER — NON-APPOINTMENT (OUTPATIENT)
Age: 80
End: 2025-06-05

## 2025-06-05 PROCEDURE — 99024 POSTOP FOLLOW-UP VISIT: CPT

## 2025-06-25 ENCOUNTER — APPOINTMENT (OUTPATIENT)
Dept: OPHTHALMOLOGY | Facility: CLINIC | Age: 80
End: 2025-06-25

## 2025-06-26 ENCOUNTER — NON-APPOINTMENT (OUTPATIENT)
Age: 80
End: 2025-06-26

## 2025-06-26 ENCOUNTER — APPOINTMENT (OUTPATIENT)
Dept: OPHTHALMOLOGY | Facility: CLINIC | Age: 80
End: 2025-06-26
Payer: MEDICARE

## 2025-06-26 PROCEDURE — 99024 POSTOP FOLLOW-UP VISIT: CPT

## 2025-07-31 ENCOUNTER — APPOINTMENT (OUTPATIENT)
Dept: OPHTHALMOLOGY | Facility: CLINIC | Age: 80
End: 2025-07-31

## 2025-07-31 PROCEDURE — 99024 POSTOP FOLLOW-UP VISIT: CPT

## 2025-08-15 ENCOUNTER — APPOINTMENT (OUTPATIENT)
Dept: OPHTHALMOLOGY | Facility: CLINIC | Age: 80
End: 2025-08-15
Payer: MEDICARE

## 2025-08-15 ENCOUNTER — NON-APPOINTMENT (OUTPATIENT)
Age: 80
End: 2025-08-15

## 2025-08-15 PROCEDURE — 92012 INTRM OPH EXAM EST PATIENT: CPT

## 2025-08-15 PROCEDURE — 76514 ECHO EXAM OF EYE THICKNESS: CPT

## 2025-09-04 ENCOUNTER — NON-APPOINTMENT (OUTPATIENT)
Age: 80
End: 2025-09-04

## 2025-09-04 ENCOUNTER — APPOINTMENT (OUTPATIENT)
Dept: OPHTHALMOLOGY | Facility: CLINIC | Age: 80
End: 2025-09-04
Payer: MEDICARE

## 2025-09-04 PROCEDURE — 92083 EXTENDED VISUAL FIELD XM: CPT

## 2025-09-04 PROCEDURE — 92012 INTRM OPH EXAM EST PATIENT: CPT

## 2025-09-17 ENCOUNTER — APPOINTMENT (OUTPATIENT)
Dept: OPHTHALMOLOGY | Facility: CLINIC | Age: 80
End: 2025-09-17

## 2025-09-19 ENCOUNTER — APPOINTMENT (OUTPATIENT)
Dept: OPHTHALMOLOGY | Facility: CLINIC | Age: 80
End: 2025-09-19
Payer: MEDICARE

## 2025-09-19 ENCOUNTER — NON-APPOINTMENT (OUTPATIENT)
Age: 80
End: 2025-09-19

## 2025-09-19 PROCEDURE — 92012 INTRM OPH EXAM EST PATIENT: CPT

## 2025-09-19 PROCEDURE — 76514 ECHO EXAM OF EYE THICKNESS: CPT

## (undated) DEVICE — PACK OPHTHALMIC 30 BALANCE TIP CUSTOM 0.9MM

## (undated) DEVICE — CANNULA IRR ALCON ANTERIOR CHAMBER 30G

## (undated) DEVICE — SPONGE OPHTHALMIC ALCON SPEAR

## (undated) DEVICE — BLADE SHARP POINT 2.75MM

## (undated) DEVICE — WARMING BLANKET LOWER ADULT

## (undated) DEVICE — CAPSULE GUARD I/A

## (undated) DEVICE — KIT CENTURION ANTERIOR

## (undated) DEVICE — VENODYNE/SCD SLEEVE CALF MEDIUM

## (undated) DEVICE — NDL HYPO NONSAFE 30G X 0.5" (BEIGE)

## (undated) DEVICE — MARKING PEN DEVON DUAL TIP W RULER

## (undated) DEVICE — GLV 6 PROTEXIS (WHITE)

## (undated) DEVICE — KNIFE STAB RESTRICTED BLADE 15DEG 5.0MM STR

## (undated) DEVICE — ENDOSERTER CORNEAL DELIVERY

## (undated) DEVICE — PACK OPTH CATARACT